# Patient Record
Sex: FEMALE | Race: BLACK OR AFRICAN AMERICAN | Employment: FULL TIME | URBAN - METROPOLITAN AREA
[De-identification: names, ages, dates, MRNs, and addresses within clinical notes are randomized per-mention and may not be internally consistent; named-entity substitution may affect disease eponyms.]

---

## 2017-12-19 ENCOUNTER — OFFICE VISIT (OUTPATIENT)
Dept: PRIMARY CARE CLINIC | Age: 47
End: 2017-12-19

## 2017-12-19 DIAGNOSIS — J02.9 SORE THROAT: Primary | ICD-10-CM

## 2017-12-19 DIAGNOSIS — H65.93 BILATERAL NON-SUPPURATIVE OTITIS MEDIA: Primary | ICD-10-CM

## 2017-12-19 LAB
S PYO AG THROAT QL: NEGATIVE
VALID INTERNAL CONTROL?: YES

## 2017-12-19 RX ORDER — AMOXICILLIN AND CLAVULANATE POTASSIUM 875; 125 MG/1; MG/1
1 TABLET, FILM COATED ORAL EVERY 12 HOURS
Qty: 20 TAB | Refills: 0 | Status: SHIPPED | OUTPATIENT
Start: 2017-12-19 | End: 2017-12-29

## 2017-12-19 RX ORDER — ALBUTEROL SULFATE 90 UG/1
AEROSOL, METERED RESPIRATORY (INHALATION)
COMMUNITY
Start: 2017-12-04 | End: 2022-10-10 | Stop reason: ALTCHOICE

## 2017-12-19 RX ORDER — DICLOFENAC SODIUM 10 MG/G
GEL TOPICAL
Refills: 0 | COMMUNITY
Start: 2017-11-14 | End: 2022-06-17

## 2017-12-19 NOTE — PROGRESS NOTES
Chief Complaint   Patient presents with    Sore Throat   c/o sore throat and productive cough with green mucus x 3 days, she denies any nausea,vomiting or fever. She denies taking anything for discomfort. Pt does confirm she has received flu shot. This note will not be viewable in [address]

## 2017-12-19 NOTE — PATIENT INSTRUCTIONS
Ear Infection (Otitis Media): Care Instructions  Your Care Instructions    An ear infection may start with a cold and affect the middle ear (otitis media). It can hurt a lot. Most ear infections clear up on their own in a couple of days. Most often you will not need antibiotics. This is because many ear infections are caused by a virus. Antibiotics don't work against a virus. Regular doses of pain medicines are the best way to reduce your fever and help you feel better. Follow-up care is a key part of your treatment and safety. Be sure to make and go to all appointments, and call your doctor if you are having problems. It's also a good idea to know your test results and keep a list of the medicines you take. How can you care for yourself at home? · Take pain medicines exactly as directed. ¨ If the doctor gave you a prescription medicine for pain, take it as prescribed. ¨ If you are not taking a prescription pain medicine, take an over-the-counter medicine, such as acetaminophen (Tylenol), ibuprofen (Advil, Motrin), or naproxen (Aleve). Read and follow all instructions on the label. ¨ Do not take two or more pain medicines at the same time unless the doctor told you to. Many pain medicines have acetaminophen, which is Tylenol. Too much acetaminophen (Tylenol) can be harmful. · Plan to take a full dose of pain reliever before bedtime. Getting enough sleep will help you get better. · Try a warm, moist washcloth on the ear. It may help relieve pain. · If your doctor prescribed antibiotics, take them as directed. Do not stop taking them just because you feel better. You need to take the full course of antibiotics. When should you call for help? Call your doctor now or seek immediate medical care if:  ? · You have new or increasing ear pain. ? · You have new or increasing pus or blood draining from your ear. ? · You have a fever with a stiff neck or a severe headache. ? Watch closely for changes in your health, and be sure to contact your doctor if:  ? · You have new or worse symptoms. ? · You are not getting better after taking an antibiotic for 2 days. Where can you learn more? Go to http://rahul-rima.info/. Enter X210 in the search box to learn more about \"Ear Infection (Otitis Media): Care Instructions. \"  Current as of: May 12, 2017  Content Version: 11.4  © 3898-3485 Healthwise, [employer]. Care instructions adapted under license by [employer] (which disclaims liability or warranty for this information). If you have questions about a medical condition or this instruction, always ask your healthcare professional. [place] [address] any warranty or liability for your use of this information.

## 2017-12-19 NOTE — PROGRESS NOTES
Subjective:      Andre Alejo is a 52 y.o. female who presents for possible ear infection. Symptoms include bilateral ear pain, congestion, sore throat and fever. Onset of symptoms was 2 days ago, gradually worsening since that time. Associated symptoms include congestion, which have been present for 3 days . She is drinking plenty of fluids. Problem List:     Patient Active Problem List    Diagnosis Date Noted    HTN (hypertension), benign 12/05/2015     Medical History:     Past Medical History:   Diagnosis Date    Hypertension      Allergies:   No Known Allergies   Medications:     Current Outpatient Prescriptions   Medication Sig    amoxicillin-clavulanate (AUGMENTIN) 875-125 mg per tablet Take 1 Tab by mouth every twelve (12) hours for 10 days.  amLODIPine (NORVASC) 5 mg tablet     lansoprazole (PREVACID) 30 mg capsule     JUNEL FE 1.5/30, 28, 1.5 mg-30 mcg (21)/75 mg (7) tablet     montelukast (SINGULAIR) 10 mg tablet Take 10 mg by mouth daily.  diclofenac (VOLTAREN) 1 % gel WYATT 2 GRAMS EXT AA QID    PROAIR HFA 90 mcg/actuation inhaler      No current facility-administered medications for this visit. Surgical History:     Past Surgical History:   Procedure Laterality Date    HX HEENT      lasix     Social History:     Social History     Social History    Marital status:      Spouse name: N/A    Number of children: N/A    Years of education: N/A     Social History Main Topics    Smoking status: Never Smoker    Smokeless tobacco: Never Used    Alcohol use No    Drug use: No    Sexual activity: Yes     Partners: Male     Birth control/ protection: Pill      Comment: Imothy     Other Topics Concern    None     Social History Narrative         Objective:     ROS:   Feeling well. No dyspnea or chest pain on exertion. No abdominal pain, change in bowel habits, black or bloody stools. No urinary tract symptoms.  GYN ROS: normal menses, no abnormal bleeding, pelvic pain or discharge, no breast pain or new or enlarging lumps on self exam. No neurological complaints. OBJECTIVE:   The patient appears well, alert, oriented x 3, in no distress. There were no vitals taken for this visit. HEENT:ENT bilat TM's red, bulging. Neck supple. No adenopathy or thyromegaly. LAWANDA. Chest: Lungs are clear, good air entry, no wheezes, rhonchi or rales. Cardiovascular: S1 and S2 normal, no murmurs, regular rate and rhythm. Abdomen: soft without tenderness, guarding, mass or organomegaly. Extremities: show no edema, normal peripheral pulses. Neurological: is normal, no focal findings. Assessment/Plan:       ICD-10-CM ICD-9-CM    1. Sore throat J02.9 462 AMB POC RAPID STREP A   Augmentin 875 mg BID x 10 days sent in to her pharmacy, instructed her on OTC medications to assist with symptoms. Blood pressure elevated today due to illness. Se will recheck it over the next few days and if still elevated, she will follow-up with PCP.

## 2018-01-24 ENCOUNTER — OFFICE VISIT (OUTPATIENT)
Dept: PRIMARY CARE CLINIC | Age: 48
End: 2018-01-24

## 2018-01-24 VITALS
HEART RATE: 79 BPM | DIASTOLIC BLOOD PRESSURE: 96 MMHG | RESPIRATION RATE: 17 BRPM | WEIGHT: 178 LBS | SYSTOLIC BLOOD PRESSURE: 139 MMHG | BODY MASS INDEX: 29.66 KG/M2 | OXYGEN SATURATION: 98 % | TEMPERATURE: 98.3 F | HEIGHT: 65 IN

## 2018-01-24 DIAGNOSIS — H65.91 RIGHT NON-SUPPURATIVE OTITIS MEDIA: Primary | ICD-10-CM

## 2018-01-24 DIAGNOSIS — H65.91 RIGHT NON-SUPPURATIVE OTITIS MEDIA: ICD-10-CM

## 2018-01-24 DIAGNOSIS — J06.9 VIRAL URI: ICD-10-CM

## 2018-01-24 DIAGNOSIS — J06.9 VIRAL URI: Primary | ICD-10-CM

## 2018-01-24 RX ORDER — CEFDINIR 300 MG/1
300 CAPSULE ORAL 2 TIMES DAILY
Qty: 14 CAP | Refills: 0 | Status: SHIPPED | OUTPATIENT
Start: 2018-01-28 | End: 2018-02-04

## 2018-01-24 NOTE — PROGRESS NOTES
Chief Complaint   Patient presents with    Ear Pain   c/o bilateral ear pain and pressure x 2 days, pt states she took otc allergy pill to help with discomfort.

## 2018-01-24 NOTE — PROGRESS NOTES
Subjective:      Kristin Timmons is a 52 y.o. female who presents for bilateral ear pain and coughing. Ear pain started 2 days ago worse today. Cough & congestions also started 2 days ago. Cough is occasionally productive clear and yellow sputum. Nasal congestion but no sore throat. She denies fever or chills. She has received flu vaccine. Daughter with viral illness last week. She has tried Tylenol today. Review of Systems   Constitutional: Negative for chills and fever. HENT: Positive for congestion and ear pain. Negative for sinus pain and sore throat. Eyes: Negative for pain and redness. Respiratory: Positive for cough. Negative for stridor. Cardiovascular: Negative for chest pain and palpitations. Gastrointestinal: Negative for abdominal pain, diarrhea and nausea. Genitourinary: Negative for dysuria, frequency and urgency. Musculoskeletal: Negative for back pain, falls and neck pain. Skin: Negative for rash. Neurological: Negative for tingling, sensory change, focal weakness, weakness and headaches. Past Medical History:   Diagnosis Date    Hypertension      Current Outpatient Prescriptions   Medication Sig Dispense Refill    [START ON 1/28/2018] cefdinir (OMNICEF) 300 mg capsule Take 1 Cap by mouth two (2) times a day for 7 days. 14 Cap 0    diclofenac (VOLTAREN) 1 % gel WYATT 2 GRAMS EXT AA QID  0    PROAIR HFA 90 mcg/actuation inhaler       amLODIPine (NORVASC) 5 mg tablet   11    lansoprazole (PREVACID) 30 mg capsule   6    JUNEL FE 1.5/30, 28, 1.5 mg-30 mcg (21)/75 mg (7) tablet   13    montelukast (SINGULAIR) 10 mg tablet Take 10 mg by mouth daily. No Known Allergies      Objective:     Visit Vitals    BP (!) 139/96 (BP 1 Location: Left arm, BP Patient Position: Sitting)    Pulse 79    Temp 98.3 °F (36.8 °C) (Oral)    Resp 17    Ht 5' 4.5\" (1.638 m)    Wt 178 lb (80.7 kg)    SpO2 98%    BMI 30.08 kg/m2     GEN: No apparent distress.  Alert and oriented and responds to all questions appropriately. EYES:  Conjunctiva clear; pupils round and reactive to light; extraocular movements are intact. EAR: External ears are normal.  Left tympanic membrane is clear and without effusion. Right tympanic membrane is mildly erythematous with mild clear effusion but no bulging. NOSE: Turbinates are edematous, clear drainage  OROPHYARYNX: No oral lesions or exudates. NECK:  Supple; no masses; thyroid normal           LUNGS: Respirations unlabored; clear to auscultation bilaterally  CARDIOVASCULAR: Regular, rate, and rhythm without murmurs, gallops or rubs   EXT: Well perfused. No edema. Moving all extremities equally. SKIN: No obvious rashes. Assessment:       ICD-10-CM ICD-9-CM    1. Viral URI J06.9 465.9     B97.89     2. Right non-suppurative otitis media H65.91 381. 4      Right ear pain with mild clear effusion and erythema possibly related to viral URI. Treated for bilateral AOM a month ago. Advised to try nasal decongestant such as afrin x2-3 days, anti-histamine, saline rinses, if pain worsening or not improving in 3-4 days or if febrile, may start abx. Return to clinic if symptoms not improving in 2-3 days. If symptoms worsen, then return to clinic immediately or go to the emergency room. This note will not be viewable in [address]

## 2018-09-19 ENCOUNTER — HOSPITAL ENCOUNTER (EMERGENCY)
Age: 48
Discharge: LEFT BEFORE DISCHARGE | End: 2018-09-20
Attending: EMERGENCY MEDICINE
Payer: COMMERCIAL

## 2018-09-19 VITALS
HEART RATE: 105 BPM | DIASTOLIC BLOOD PRESSURE: 111 MMHG | HEIGHT: 64 IN | WEIGHT: 171.96 LBS | BODY MASS INDEX: 29.36 KG/M2 | OXYGEN SATURATION: 100 % | SYSTOLIC BLOOD PRESSURE: 201 MMHG | RESPIRATION RATE: 17 BRPM

## 2018-09-19 PROCEDURE — 75810000275 HC EMERGENCY DEPT VISIT NO LEVEL OF CARE

## 2018-09-19 PROCEDURE — 93005 ELECTROCARDIOGRAM TRACING: CPT

## 2018-09-20 LAB
ATRIAL RATE: 98 BPM
CALCULATED P AXIS, ECG09: 65 DEGREES
CALCULATED R AXIS, ECG10: 21 DEGREES
CALCULATED T AXIS, ECG11: 13 DEGREES
DIAGNOSIS, 93000: NORMAL
P-R INTERVAL, ECG05: 166 MS
Q-T INTERVAL, ECG07: 352 MS
QRS DURATION, ECG06: 88 MS
QTC CALCULATION (BEZET), ECG08: 449 MS
VENTRICULAR RATE, ECG03: 98 BPM

## 2019-02-08 ENCOUNTER — OFFICE VISIT (OUTPATIENT)
Dept: PRIMARY CARE CLINIC | Age: 49
End: 2019-02-08

## 2019-02-08 VITALS
HEART RATE: 93 BPM | TEMPERATURE: 98.3 F | BODY MASS INDEX: 29.6 KG/M2 | DIASTOLIC BLOOD PRESSURE: 98 MMHG | WEIGHT: 173.4 LBS | SYSTOLIC BLOOD PRESSURE: 145 MMHG | HEIGHT: 64 IN | RESPIRATION RATE: 16 BRPM

## 2019-02-08 DIAGNOSIS — J01.00 ACUTE MAXILLARY SINUSITIS, RECURRENCE NOT SPECIFIED: Primary | ICD-10-CM

## 2019-02-08 DIAGNOSIS — H66.92 LEFT ACUTE OTITIS MEDIA: ICD-10-CM

## 2019-02-08 DIAGNOSIS — I10 ESSENTIAL HYPERTENSION: ICD-10-CM

## 2019-02-08 RX ORDER — AMOXICILLIN AND CLAVULANATE POTASSIUM 875; 125 MG/1; MG/1
1 TABLET, FILM COATED ORAL EVERY 12 HOURS
Qty: 14 TAB | Refills: 0 | Status: SHIPPED | OUTPATIENT
Start: 2019-02-08 | End: 2019-02-15

## 2019-02-08 RX ORDER — FLUTICASONE PROPIONATE 50 MCG
2 SPRAY, SUSPENSION (ML) NASAL DAILY
Qty: 1 BOTTLE | Refills: 0 | Status: SHIPPED | OUTPATIENT
Start: 2019-02-08 | End: 2019-02-08 | Stop reason: SDUPTHER

## 2019-02-08 RX ORDER — FLUTICASONE PROPIONATE 50 MCG
2 SPRAY, SUSPENSION (ML) NASAL DAILY
Qty: 1 BOTTLE | Refills: 0 | Status: SHIPPED | OUTPATIENT
Start: 2019-02-08

## 2019-02-08 RX ORDER — AMOXICILLIN AND CLAVULANATE POTASSIUM 875; 125 MG/1; MG/1
1 TABLET, FILM COATED ORAL EVERY 12 HOURS
Qty: 14 TAB | Refills: 0 | Status: SHIPPED | OUTPATIENT
Start: 2019-02-08 | End: 2019-02-08 | Stop reason: SDUPTHER

## 2019-02-08 NOTE — PATIENT INSTRUCTIONS
Sinusitis: Care Instructions Your Care Instructions Sinusitis is an infection of the lining of the sinus cavities in your head. Sinusitis often follows a cold. It causes pain and pressure in your head and face. In most cases, sinusitis gets better on its own in 1 to 2 weeks. But some mild symptoms may last for several weeks. Sometimes antibiotics are needed. Follow-up care is a key part of your treatment and safety. Be sure to make and go to all appointments, and call your doctor if you are having problems. It's also a good idea to know your test results and keep a list of the medicines you take. How can you care for yourself at home? · Take an over-the-counter pain medicine, such as acetaminophen (Tylenol), ibuprofen (Advil, Motrin), or naproxen (Aleve). Read and follow all instructions on the label. · If the doctor prescribed antibiotics, take them as directed. Do not stop taking them just because you feel better. You need to take the full course of antibiotics. · Be careful when taking over-the-counter cold or flu medicines and Tylenol at the same time. Many of these medicines have acetaminophen, which is Tylenol. Read the labels to make sure that you are not taking more than the recommended dose. Too much acetaminophen (Tylenol) can be harmful. · Breathe warm, moist air from a steamy shower, a hot bath, or a sink filled with hot water. Avoid cold, dry air. Using a humidifier in your home may help. Follow the directions for cleaning the machine. · Use saline (saltwater) nasal washes to help keep your nasal passages open and wash out mucus and bacteria. You can buy saline nose drops at a grocery store or drugstore. Or you can make your own at home by adding 1 teaspoon of salt and 1 teaspoon of baking soda to 2 cups of distilled water. If you make your own, fill a bulb syringe with the solution, insert the tip into your nostril, and squeeze gently. Darian Clonts your nose. · Put a hot, wet towel or a warm gel pack on your face 3 or 4 times a day for 5 to 10 minutes each time. · Try a decongestant nasal spray like oxymetazoline (Afrin). Do not use it for more than 3 days in a row. Using it for more than 3 days can make your congestion worse. When should you call for help? Call your doctor now or seek immediate medical care if: 
  · You have new or worse swelling or redness in your face or around your eyes.  
  · You have a new or higher fever.  
 Watch closely for changes in your health, and be sure to contact your doctor if: 
  · You have new or worse facial pain.  
  · The mucus from your nose becomes thicker (like pus) or has new blood in it.  
  · You are not getting better as expected. Where can you learn more? Go to http://rahul-rima.info/. Enter V380 in the search box to learn more about \"Sinusitis: Care Instructions. \" Current as of: March 27, 2018 Content Version: 11.9 © 4814-2784 [employer]. Care instructions adapted under license by [employer] (which disclaims liability or warranty for this information). If you have questions about a medical condition or this instruction, always ask your healthcare professional. [address] any warranty or liability for your use of this information.

## 2019-02-08 NOTE — PROGRESS NOTES
Went to [facility] on Sunday- dx'd with viral URI. Called on Wed- no better. [facility] called in [address] Now her LEFT ear hurts, L sided H/A, cough and worried about hernia left of umbilicus.

## 2019-02-08 NOTE — LETTER
NOTIFICATION RETURN TO WORK / SCHOOL 
 
2/8/2019 2:18 PM 
 
Ms. Carmela Burgos [place] [address] To Whom It May Concern: 
 
Carmela Burgos is currently under the care of [address] [place] [address]. She will return to work/school on 2/11/2019. If there are questions or concerns please have the patient contact our office. Sincerely, Rodger Saleh NP

## 2019-02-11 NOTE — PROGRESS NOTES
Subjective:  
Yaakov Galeazzi is a 50 y.o. female who complains of congestion, sore throat, post nasal drip, dry cough, headache, bilateral sinus pain and left ear pain for 5 days, gradually worsening since that time. Symptoms started 5 days ago. Seen at [facility] urgent care and diagnosed with viral URI. No improvement after few days. Called back and Karie Gottlieb was called in. Taking zpack < 48 hrs without significant improvement. Head and nasal congestion persist.  More recently developed left ear pain. Now abdominal soreness from coughing. She denies a history of shortness of breath and wheezing. Patient does not smoke cigarettes. Relevant PMH:  
Past Medical History:  
Diagnosis Date  Hypertension Past Surgical History:  
Procedure Laterality Date  HX HEENT    
 lasix Allergies Allergen Reactions  Compazine [Prochlorperazine] Anaphylaxis  Sulfa (Sulfonamide Antibiotics) Anaphylaxis Review of Systems Pertinent items are noted in HPI. Objective:  
 
Visit Vitals BP (!) 145/98 (BP 1 Location: Right arm, BP Patient Position: Sitting) Pulse 93 Temp 98.3 °F (36.8 °C) (Oral) Resp 16 Ht 5' 4\" (1.626 m) Wt 173 lb 6.4 oz (78.7 kg) BMI 29.76 kg/m² General:  alert, cooperative, no distress Eyes: negative Ears: abnormal TM AS - erythematous, dull, bulging Sinuses: tenderness over bilateral maxillary and frontal  
Mouth:  Lips, mucosa, and tongue normal. Teeth and gums normal and normal findings: oropharynx pink & moist without lesions or evidence of thrush Neck: supple, symmetrical, trachea midline and no adenopathy. Heart: S1 and S2 normal, no murmurs noted. Lungs: clear to auscultation bilaterally Abdomen: soft, non-tender. Bowel sounds normal. No masses,  no organomegaly. No hernia is appreciated. Assessment/Plan: ICD-10-CM ICD-9-CM 1. Acute maxillary sinusitis, recurrence not specified J01.00 461.0 2. Left acute otitis media H66.92 382.9 3. Essential hypertension I10 401.9   
-BP noted. Pt reports compliance with antihypertensive meds. Acutely ill today. Continue to monitor prn. Orders Placed This Encounter  DISCONTD: amoxicillin-clavulanate (AUGMENTIN) 875-125 mg per tablet  DISCONTD: fluticasone (FLONASE) 50 mcg/actuation nasal spray  
 amoxicillin-clavulanate (AUGMENTIN) 875-125 mg per tablet  fluticasone (FLONASE) 50 mcg/actuation nasal spray Stop Z-pack and start Augmentin. Discussed the dx and tx of sinusitis. Suggested symptomatic OTC remedies. Nasal saline sprays for congestion. Antibiotics per orders. RTC prn. Kelli Pantoja NP This note will not be viewable in [address]

## 2019-11-02 ENCOUNTER — OFFICE VISIT (OUTPATIENT)
Dept: PRIMARY CARE CLINIC | Age: 49
End: 2019-11-02

## 2019-11-02 VITALS
OXYGEN SATURATION: 96 % | TEMPERATURE: 98 F | HEART RATE: 73 BPM | DIASTOLIC BLOOD PRESSURE: 85 MMHG | RESPIRATION RATE: 15 BRPM | HEIGHT: 64 IN | BODY MASS INDEX: 30.19 KG/M2 | WEIGHT: 176.8 LBS | SYSTOLIC BLOOD PRESSURE: 127 MMHG

## 2019-11-02 DIAGNOSIS — R05.9 COUGH: ICD-10-CM

## 2019-11-02 DIAGNOSIS — J30.9 ALLERGIC RHINITIS, UNSPECIFIED SEASONALITY, UNSPECIFIED TRIGGER: ICD-10-CM

## 2019-11-02 DIAGNOSIS — H10.33 ACUTE CONJUNCTIVITIS OF BOTH EYES, UNSPECIFIED ACUTE CONJUNCTIVITIS TYPE: Primary | ICD-10-CM

## 2019-11-02 DIAGNOSIS — R09.81 HEAD CONGESTION: ICD-10-CM

## 2019-11-02 DIAGNOSIS — J06.9 VIRAL URI: ICD-10-CM

## 2019-11-02 RX ORDER — POTASSIUM CHLORIDE 750 MG/1
TABLET, EXTENDED RELEASE ORAL
COMMUNITY
End: 2022-10-10

## 2019-11-02 RX ORDER — NEOMYCIN SULFATE, POLYMYXIN B SULFATE AND DEXAMETHASONE 3.5; 10000; 1 MG/ML; [USP'U]/ML; MG/ML
1 SUSPENSION/ DROPS OPHTHALMIC 2 TIMES DAILY
Qty: 1 BOTTLE | Refills: 0 | Status: SHIPPED | OUTPATIENT
Start: 2019-11-02 | End: 2022-10-10

## 2019-11-02 RX ORDER — CHLORTHALIDONE 25 MG/1
25 TABLET ORAL
Refills: 0 | COMMUNITY
Start: 2019-09-30 | End: 2022-10-10

## 2019-11-02 RX ORDER — ACETAMINOPHEN AND CODEINE PHOSPHATE 120; 12 MG/5ML; MG/5ML
SOLUTION ORAL
COMMUNITY
End: 2022-10-10

## 2019-11-02 RX ORDER — BENZONATATE 200 MG/1
200 CAPSULE ORAL
Qty: 21 CAP | Refills: 0 | Status: SHIPPED | OUTPATIENT
Start: 2019-11-02 | End: 2019-11-09

## 2019-11-02 RX ORDER — LEVOCETIRIZINE DIHYDROCHLORIDE 5 MG/1
5 TABLET, FILM COATED ORAL DAILY
Qty: 30 TAB | Refills: 0 | Status: SHIPPED | OUTPATIENT
Start: 2019-11-02 | End: 2019-12-02

## 2019-11-02 NOTE — PATIENT INSTRUCTIONS
Pinkeye: Care Instructions  Your Care Instructions    Pinkeye is redness and swelling of the eye surface and the conjunctiva (the lining of the eyelid and the covering of the white part of the eye). Pinkeye is also called conjunctivitis. Pinkeye is often caused by infection with bacteria or a virus. Dry air, allergies, smoke, and chemicals are other common causes. Pinkeye often clears on its own in 7 to 10 days. Antibiotics only help if the pinkeye is caused by bacteria. Pinkeye caused by infection spreads easily. If an allergy or chemical is causing pinkeye, it will not go away unless you can avoid whatever is causing it. Follow-up care is a key part of your treatment and safety. Be sure to make and go to all appointments, and call your doctor if you are having problems. It's also a good idea to know your test results and keep a list of the medicines you take. How can you care for yourself at home? · Wash your hands often. Always wash them before and after you treat pinkeye or touch your eyes or face. · Use moist cotton or a clean, wet cloth to remove crust. Wipe from the inside corner of the eye to the outside. Use a clean part of the cloth for each wipe. · Put cold or warm wet cloths on your eye a few times a day if the eye hurts. · Do not wear contact lenses or eye makeup until the pinkeye is gone. Throw away any eye makeup you were using when you got pinkeye. Clean your contacts and storage case. If you wear disposable contacts, use a new pair when your eye has cleared and it is safe to wear contacts again. · If the doctor gave you antibiotic ointment or eyedrops, use them as directed. Use the medicine for as long as instructed, even if your eye starts looking better soon. Keep the bottle tip clean, and do not let it touch the eye area. · To put in eyedrops or ointment:  ? Tilt your head back, and pull your lower eyelid down with one finger. ?  Drop or squirt the medicine inside the lower lid.  ? Close your eye for 30 to 60 seconds to let the drops or ointment move around. ? Do not touch the ointment or dropper tip to your eyelashes or any other surface. · Do not share towels, pillows, or washcloths while you have pinkeye. When should you call for help? Call your doctor now or seek immediate medical care if:    · You have pain in your eye, not just irritation on the surface.     · You have a change in vision or loss of vision.     · You have an increase in discharge from the eye.     · Your eye has not started to improve or begins to get worse within 48 hours after you start using antibiotics.     · Pinkeye lasts longer than 7 days.    Watch closely for changes in your health, and be sure to contact your doctor if you have any problems. Where can you learn more? Go to http://rahul-rima.info/. Enter Y392 in the search box to learn more about \"Pinkeye: Care Instructions. \"  Current as of: June 26, 2019  Content Version: 12.2  © 5669-9103 Healthwise[employer]. Care instructions adapted under license by [employer] (which disclaims liability or warranty for this information). If you have questions about a medical condition or this instruction, always ask your healthcare professional. [address] any warranty or liability for your use of this information.

## 2019-11-02 NOTE — PROGRESS NOTES
Em Verma is a 52 y.o. female    Room:6    Chief Complaint   Patient presents with    Cold Symptoms     Pt States \" having issue right eye kind of runny and crusty, light irritates it, cough some phlegm comes up and sometimes it doesnt X2 weeks, has not taking medication for symptoms\". Visit Vitals  /85 (BP 1 Location: Left arm, BP Patient Position: Sitting)   Pulse 73   Temp 98 °F (36.7 °C) (Oral)   Resp 15   Ht 5' 4\" (1.626 m)   Wt 176 lb 12.8 oz (80.2 kg)   SpO2 96%   BMI 30.35 kg/m²       Pain Scale: 0 - No pain/10    1. Have you been to the ER, urgent care clinic since your last visit? Hospitalized since your last visit? No    2. Have you seen or consulted any other health care providers outside of the [address] [place] [address] since your last visit? Include any pap smears or colon screening.  No

## 2019-11-05 NOTE — PROGRESS NOTES
Subjective:   Cesar Manzano is a 52 y.o. female who presents for evaluation of redness. She has noticed the above symptoms in the bilateral eye for 2 days. Onset was acute. Symptoms have included discharge, itching. Patient denies blurred vision, visual field deficit, photophobia. There is a history of allergies    Review of Systems  negative, wears glasses or bifocals, wears contact lenses, blurry vision, eye pain, seeing double, spots before eyes, blind areas    Objective:     Visit Vitals  /85 (BP 1 Location: Left arm, BP Patient Position: Sitting)   Pulse 73   Temp 98 °F (36.7 °C) (Oral)   Resp 15   Ht 5' 4\" (1.626 m)   Wt 176 lb 12.8 oz (80.2 kg)   LMP  (LMP Unknown)   SpO2 96%   BMI 30.35 kg/m²                 General: alert, cooperative, no distress, appears stated age   Eyes:  positive findings: sclera erythematous   Vision: See above, Not performed   Fluorescein:  no uptake seen     Assessment/Plan:     acute conjunctivitis    1. Discussed the diagnosis and proper care of conjunctivitis. Stressed household hygiene. 2. Patient instructions: contagiousness precautions  3. Risks and side effects of medications was discussed. 4. Pt advised to read written information provided by the pharmacist: yes  5. Followup as needed. ICD-10-CM ICD-9-CM    1. Acute conjunctivitis of both eyes, unspecified acute conjunctivitis type H10.33 372.00 neomycin-polymyxin-dexamethasone (MAXITROL) ophthalmic suspension   2. Viral URI J06.9 465.9 levocetirizine (XYZAL) 5 mg tablet   3. Cough R05 786.2 benzonatate (TESSALON) 200 mg capsule   4. Head congestion R09.81 478.19 levocetirizine (XYZAL) 5 mg tablet     reviewed medications and side effects in detail.

## 2019-12-26 ENCOUNTER — OFFICE VISIT (OUTPATIENT)
Dept: PRIMARY CARE CLINIC | Age: 49
End: 2019-12-26

## 2019-12-26 VITALS
OXYGEN SATURATION: 94 % | WEIGHT: 177.8 LBS | HEIGHT: 64 IN | TEMPERATURE: 98.1 F | SYSTOLIC BLOOD PRESSURE: 120 MMHG | HEART RATE: 82 BPM | BODY MASS INDEX: 30.35 KG/M2 | RESPIRATION RATE: 16 BRPM | DIASTOLIC BLOOD PRESSURE: 80 MMHG

## 2019-12-26 DIAGNOSIS — M54.9 BACK PAIN, UNSPECIFIED BACK LOCATION, UNSPECIFIED BACK PAIN LATERALITY, UNSPECIFIED CHRONICITY: Primary | ICD-10-CM

## 2019-12-26 LAB
BILIRUB UR QL STRIP: NEGATIVE
GLUCOSE UR-MCNC: NEGATIVE MG/DL
KETONES P FAST UR STRIP-MCNC: NEGATIVE MG/DL
PH UR STRIP: 7.5 [PH] (ref 4.6–8)
PROT UR QL STRIP: NEGATIVE
SP GR UR STRIP: 1.01 (ref 1–1.03)
UA UROBILINOGEN AMB POC: NORMAL (ref 0.2–1)
URINALYSIS CLARITY POC: CLEAR
URINALYSIS COLOR POC: YELLOW
URINE BLOOD POC: NEGATIVE
URINE LEUKOCYTES POC: NEGATIVE
URINE NITRITES POC: NEGATIVE

## 2019-12-26 RX ORDER — DICLOFENAC SODIUM 75 MG/1
75 TABLET, DELAYED RELEASE ORAL 2 TIMES DAILY
Qty: 30 TAB | Refills: 0 | Status: SHIPPED | OUTPATIENT
Start: 2019-12-26 | End: 2022-06-17

## 2019-12-26 RX ORDER — HYDROCHLOROTHIAZIDE 25 MG/1
25 TABLET ORAL
COMMUNITY
End: 2022-10-10

## 2019-12-26 RX ORDER — TRIAMCINOLONE ACETONIDE 1 MG/G
CREAM TOPICAL
COMMUNITY
Start: 2019-11-10

## 2019-12-26 RX ORDER — LANSOPRAZOLE 30 MG/1
CAPSULE, DELAYED RELEASE ORAL
COMMUNITY
Start: 2012-08-16 | End: 2019-12-26 | Stop reason: SDUPTHER

## 2019-12-26 RX ORDER — BUTALBITAL, ACETAMINOPHEN AND CAFFEINE 50; 325; 40 MG/1; MG/1; MG/1
1 TABLET ORAL
COMMUNITY
End: 2022-10-10

## 2019-12-26 RX ORDER — NORETHINDRONE ACETATE AND ETHINYL ESTRADIOL 1MG-20(21)
KIT ORAL
COMMUNITY
Start: 2012-10-18 | End: 2019-12-26 | Stop reason: SDUPTHER

## 2019-12-26 RX ORDER — VALSARTAN AND HYDROCHLOROTHIAZIDE 160; 12.5 MG/1; MG/1
TABLET, FILM COATED ORAL
COMMUNITY
Start: 2019-11-13

## 2019-12-26 RX ORDER — MONTELUKAST SODIUM 10 MG/1
TABLET ORAL
COMMUNITY
Start: 2016-12-20 | End: 2019-12-26 | Stop reason: SDUPTHER

## 2019-12-26 RX ORDER — NORETHINDRONE ACETATE AND ETHINYL ESTRADIOL .03; 1.5 MG/1; MG/1
1 TABLET ORAL
COMMUNITY
End: 2022-10-10

## 2019-12-26 RX ORDER — AMLODIPINE BESYLATE 5 MG/1
TABLET ORAL
COMMUNITY
End: 2019-12-26 | Stop reason: SDUPTHER

## 2019-12-26 RX ORDER — MINERAL OIL
180 ENEMA (ML) RECTAL
COMMUNITY
End: 2022-10-10

## 2019-12-26 NOTE — PROGRESS NOTES
Heidi Parra is a 52 y.o. female    Room:4    Chief Complaint   Patient presents with    Back Pain     Pt c/o low back pain, Pt States \" last couple of days low back pain and just a little cough, has taken a tessalon chevy\". Visit Vitals  /80 (BP 1 Location: Left arm, BP Patient Position: Sitting)   Pulse 82   Temp 98.1 °F (36.7 °C) (Oral)   Resp 16   Ht 5' 4\" (1.626 m)   Wt 177 lb 12.8 oz (80.6 kg)   SpO2 94%   BMI 30.52 kg/m²       Pain Scale: 6/10    1. Have you been to the ER, urgent care clinic since your last visit? Hospitalized since your last visit? No    2. Have you seen or consulted any other health care providers outside of the [address] [place] [address] since your last visit? Include any pap smears or colon screening.  No

## 2019-12-26 NOTE — PATIENT INSTRUCTIONS
Learning About How to Have a Healthy Back  What causes back pain? Back pain is often caused by overuse, strain, or injury. For example, people often hurt their backs playing sports or working in the yard, being jolted in a car accident, or lifting something too heavy. Aging plays a part too. Your bones and muscles tend to lose strength as you age, which makes injury more likely. The spongy discs between the bones of the spine (vertebrae) may suffer from wear and tear and no longer provide enough cushion between the bones. A disc that bulges or breaks open (herniated disc) can press on nerves, causing back pain. In some people, back pain is the result of arthritis, broken vertebrae caused by bone loss (osteoporosis), illness, or a spine problem. Although most people have back pain at one time or another, there are steps you can take to make it less likely. How can you have a healthy back? Reduce stress on your back through good posture  Slumping or slouching alone may not cause low back pain. But after the back has been strained or injured, bad posture can make pain worse. · Sleep in a position that maintains your back's normal curves and on a mattress that feels comfortable. Sleep on your side with a pillow between your knees, or sleep on your back with a pillow under your knees. These positions can reduce strain on your back. · Stand and sit up straight. \"Good posture\" generally means your ears, shoulders, and hips are in a straight line. · If you must stand for a long time, put one foot on a stool, ledge, or box. Switch feet every now and then. · Sit in a chair that is low enough to let you place both feet flat on the floor with both knees nearly level with your hips. If your chair or desk is too high, use a footrest to raise your knees. Place a small pillow, a rolled-up towel, or a lumbar roll in the curve of your back if you need extra support.   · Try a kneeling chair, which helps tilt your hips forward. This takes pressure off your lower back. · Try sitting on an exercise ball. It can rock from side to side, which helps keep your back loose. · When driving, keep your knees nearly level with your hips. Sit straight, and drive with both hands on the steering wheel. Your arms should be in a slightly bent position. Reduce stress on your back through careful lifting  · Squat down, bending at the hips and knees only. If you need to, put one knee to the floor and extend your other knee in front of you, bent at a right angle (half kneeling). · Press your chest straight forward. This helps keep your upper back straight while keeping a slight arch in your low back. · Hold the load as close to your body as possible, at the level of your belly button (navel). · Use your feet to change direction, taking small steps. · Lead with your hips as you change direction. Keep your shoulders in line with your hips as you move. · Set down your load carefully, squatting with your knees and hips only. Exercise and stretch your back  · Do some exercise on most days of the week, if your doctor says it is okay. You can walk, run, swim, or cycle. · Stretch your back muscles. Here are a few exercises to try:  ? Lie on your back, and gently pull one bent knee to your chest. Put that foot back on the floor, and then pull the other knee to your chest.  ? Do pelvic tilts. Lie on your back with your knees bent. Tighten your stomach muscles. Pull your belly button (navel) in and up toward your ribs. You should feel like your back is pressing to the floor and your hips and pelvis are slightly lifting off the floor. Hold for 6 seconds while breathing smoothly. ? Sit with your back flat against a wall. · Keep your core muscles strong. The muscles of your back, belly (abdomen), and buttocks support your spine. ? Pull in your belly and imagine pulling your navel toward your spine. Hold this for 6 seconds, then relax.  Remember to keep breathing normally as you tense your muscles. ? Do curl-ups. Always do them with your knees bent. Keep your low back on the floor, and curl your shoulders toward your knees using a smooth, slow motion. Keep your arms folded across your chest. If this bothers your neck, try putting your hands behind your neck (not your head), with your elbows spread apart. ? Lie on your back with your knees bent and your feet flat on the floor. Tighten your belly muscles, and then push with your feet and raise your buttocks up a few inches. Hold this position 6 seconds as you continue to breathe normally, then lower yourself slowly to the floor. Repeat 8 to 12 times. ? If you like group exercise, try Pilates or yoga. These classes have poses that strengthen the core muscles. Lead a healthy lifestyle  · Stay at a healthy weight to avoid strain on your back. · Do not smoke. Smoking increases the risk of osteoporosis, which weakens the spine. If you need help quitting, talk to your doctor about stop-smoking programs and medicines. These can increase your chances of quitting for good. Where can you learn more? Go to http://[employer].info/. Enter L315 in the search box to learn more about \"Learning About How to Have a Healthy Back. \"  Current as of: June 26, 2019  Content Version: 12.2  © 6529-7332 [employer]. Care instructions adapted under license by [employer] (which disclaims liability or warranty for this information). If you have questions about a medical condition or this instruction, always ask your healthcare professional. [place] [address] any warranty or liability for your use of this information. Learning About How to Have a Healthy Back  What causes back pain? Back pain is often caused by overuse, strain, or injury.  For example, people often hurt their backs playing sports or working in the yard, being jolted in a car accident, or lifting something too heavy. Aging plays a part too. Your bones and muscles tend to lose strength as you age, which makes injury more likely. The spongy discs between the bones of the spine (vertebrae) may suffer from wear and tear and no longer provide enough cushion between the bones. A disc that bulges or breaks open (herniated disc) can press on nerves, causing back pain. In some people, back pain is the result of arthritis, broken vertebrae caused by bone loss (osteoporosis), illness, or a spine problem. Although most people have back pain at one time or another, there are steps you can take to make it less likely. How can you have a healthy back? Reduce stress on your back through good posture  Slumping or slouching alone may not cause low back pain. But after the back has been strained or injured, bad posture can make pain worse. · Sleep in a position that maintains your back's normal curves and on a mattress that feels comfortable. Sleep on your side with a pillow between your knees, or sleep on your back with a pillow under your knees. These positions can reduce strain on your back. · Stand and sit up straight. \"Good posture\" generally means your ears, shoulders, and hips are in a straight line. · If you must stand for a long time, put one foot on a stool, ledge, or box. Switch feet every now and then. · Sit in a chair that is low enough to let you place both feet flat on the floor with both knees nearly level with your hips. If your chair or desk is too high, use a footrest to raise your knees. Place a small pillow, a rolled-up towel, or a lumbar roll in the curve of your back if you need extra support. · Try a kneeling chair, which helps tilt your hips forward. This takes pressure off your lower back. · Try sitting on an exercise ball. It can rock from side to side, which helps keep your back loose. · When driving, keep your knees nearly level with your hips.  Sit straight, and drive with both hands on the steering wheel. Your arms should be in a slightly bent position. Reduce stress on your back through careful lifting  · Squat down, bending at the hips and knees only. If you need to, put one knee to the floor and extend your other knee in front of you, bent at a right angle (half kneeling). · Press your chest straight forward. This helps keep your upper back straight while keeping a slight arch in your low back. · Hold the load as close to your body as possible, at the level of your belly button (navel). · Use your feet to change direction, taking small steps. · Lead with your hips as you change direction. Keep your shoulders in line with your hips as you move. · Set down your load carefully, squatting with your knees and hips only. Exercise and stretch your back  · Do some exercise on most days of the week, if your doctor says it is okay. You can walk, run, swim, or cycle. · Stretch your back muscles. Here are a few exercises to try:  ? Lie on your back, and gently pull one bent knee to your chest. Put that foot back on the floor, and then pull the other knee to your chest.  ? Do pelvic tilts. Lie on your back with your knees bent. Tighten your stomach muscles. Pull your belly button (navel) in and up toward your ribs. You should feel like your back is pressing to the floor and your hips and pelvis are slightly lifting off the floor. Hold for 6 seconds while breathing smoothly. ? Sit with your back flat against a wall. · Keep your core muscles strong. The muscles of your back, belly (abdomen), and buttocks support your spine. ? Pull in your belly and imagine pulling your navel toward your spine. Hold this for 6 seconds, then relax. Remember to keep breathing normally as you tense your muscles. ? Do curl-ups. Always do them with your knees bent. Keep your low back on the floor, and curl your shoulders toward your knees using a smooth, slow motion.  Keep your arms folded across your chest. If this bothers your neck, try putting your hands behind your neck (not your head), with your elbows spread apart. ? Lie on your back with your knees bent and your feet flat on the floor. Tighten your belly muscles, and then push with your feet and raise your buttocks up a few inches. Hold this position 6 seconds as you continue to breathe normally, then lower yourself slowly to the floor. Repeat 8 to 12 times. ? If you like group exercise, try Pilates or yoga. These classes have poses that strengthen the core muscles. Lead a healthy lifestyle  · Stay at a healthy weight to avoid strain on your back. · Do not smoke. Smoking increases the risk of osteoporosis, which weakens the spine. If you need help quitting, talk to your doctor about stop-smoking programs and medicines. These can increase your chances of quitting for good. Where can you learn more? Go to http://[employer].info/. Enter L315 in the search box to learn more about \"Learning About How to Have a Healthy Back. \"  Current as of: June 26, 2019  Content Version: 12.2  © 9366-8205 [employer]. Care instructions adapted under license by [employer] (which disclaims liability or warranty for this information). If you have questions about a medical condition or this instruction, always ask your healthcare professional. [address] any warranty or liability for your use of this information.

## 2019-12-27 LAB — BACTERIA UR CULT: NORMAL

## 2020-02-06 ENCOUNTER — OFFICE VISIT (OUTPATIENT)
Dept: PRIMARY CARE CLINIC | Age: 50
End: 2020-02-06

## 2020-02-06 VITALS
TEMPERATURE: 98.4 F | BODY MASS INDEX: 29.88 KG/M2 | HEART RATE: 97 BPM | RESPIRATION RATE: 16 BRPM | SYSTOLIC BLOOD PRESSURE: 107 MMHG | OXYGEN SATURATION: 94 % | DIASTOLIC BLOOD PRESSURE: 72 MMHG | WEIGHT: 175 LBS | HEIGHT: 64 IN

## 2020-02-06 DIAGNOSIS — H93.8X3 SENSATION OF FULLNESS IN BOTH EARS: Primary | ICD-10-CM

## 2020-02-06 DIAGNOSIS — R09.81 NASAL CONGESTION: ICD-10-CM

## 2020-02-06 DIAGNOSIS — R05.9 COUGH: ICD-10-CM

## 2020-02-06 RX ORDER — FLUTICASONE PROPIONATE 50 MCG
2 SPRAY, SUSPENSION (ML) NASAL DAILY
Qty: 1 BOTTLE | Refills: 0 | Status: SHIPPED | OUTPATIENT
Start: 2020-02-06 | End: 2022-10-10

## 2020-02-06 NOTE — PROGRESS NOTES
RM 4    Chief Complaint   Patient presents with    Ear Pain     chest congestion, both ears pain and pressure x 2 days       Visit Vitals  /72 (BP 1 Location: Left arm, BP Patient Position: Sitting)   Pulse 97   Temp 98.4 °F (36.9 °C) (Oral)   Resp 16   Ht 5' 4\" (1.626 m)   Wt 175 lb (79.4 kg)   SpO2 94%   BMI 30.04 kg/m²

## 2020-02-06 NOTE — PROGRESS NOTES
HISTORY OF PRESENT ILLNESS  Katey Bull is a 52 y.o. female Patient reports nasal congestion, dry cough, and ear pain x 3 days. No fever, chills, or body aches. She has not taken anything for symptoms. Her  has had similar symptoms. Visit Vitals  /72 (BP 1 Location: Left arm, BP Patient Position: Sitting)   Pulse 97   Temp 98.4 °F (36.9 °C) (Oral)   Resp 16   Ht 5' 4\" (1.626 m)   Wt 175 lb (79.4 kg)   SpO2 94%   BMI 30.04 kg/m²       HPI    Review of Systems   Constitutional: Negative for fever. HENT: Positive for congestion and ear pain. Negative for sore throat. Respiratory: Positive for cough. Physical Exam  Constitutional:       Appearance: Normal appearance. HENT:      Head: Normocephalic. Right Ear: Hearing, ear canal and external ear normal.      Left Ear: Hearing, ear canal and external ear normal. Tympanic membrane is bulging. Ears:      Comments: Clear fluid noted behind both     Nose: Congestion present. Right Turbinates: Swollen. Left Turbinates: Swollen. Mouth/Throat:      Lips: Pink. Mouth: Mucous membranes are moist.      Pharynx: Oropharynx is clear. Cardiovascular:      Rate and Rhythm: Normal rate and regular rhythm. Pulmonary:      Effort: Pulmonary effort is normal.      Breath sounds: Normal breath sounds. Skin:     General: Skin is warm and dry. Neurological:      General: No focal deficit present. Mental Status: She is alert and oriented to person, place, and time. Psychiatric:         Mood and Affect: Mood normal.         Behavior: Behavior normal.         ASSESSMENT and PLAN    ICD-10-CM ICD-9-CM    1. Sensation of fullness in both ears H93.8X3 388.8    2. Cough R05 786.2    3.  Nasal congestion R09.81 478.19      Orders Placed This Encounter    fluticasone propionate (FLONASE) 50 mcg/actuation nasal spray   zyrtec and flonase  Motrin as needed for pain

## 2020-11-03 ENCOUNTER — TRANSCRIBE ORDER (OUTPATIENT)
Dept: SCHEDULING | Age: 50
End: 2020-11-03

## 2020-11-03 DIAGNOSIS — R10.2 VAGINAL PAIN: Primary | ICD-10-CM

## 2022-06-17 ENCOUNTER — OFFICE VISIT (OUTPATIENT)
Dept: ORTHOPEDIC SURGERY | Age: 52
End: 2022-06-17
Payer: COMMERCIAL

## 2022-06-17 VITALS — WEIGHT: 182 LBS | HEIGHT: 64 IN | BODY MASS INDEX: 31.07 KG/M2

## 2022-06-17 DIAGNOSIS — M16.11 ARTHRITIS OF RIGHT HIP: Primary | ICD-10-CM

## 2022-06-17 DIAGNOSIS — M25.551 RIGHT HIP PAIN: ICD-10-CM

## 2022-06-17 PROCEDURE — 99214 OFFICE O/P EST MOD 30 MIN: CPT | Performed by: PHYSICIAN ASSISTANT

## 2022-06-17 RX ORDER — DICLOFENAC SODIUM 75 MG/1
75 TABLET, DELAYED RELEASE ORAL 2 TIMES DAILY WITH MEALS
Qty: 60 TABLET | Refills: 0 | Status: SHIPPED | OUTPATIENT
Start: 2022-06-17 | End: 2022-10-10 | Stop reason: ALTCHOICE

## 2022-06-17 RX ORDER — METHYLPREDNISOLONE 4 MG/1
4 TABLET ORAL
Qty: 1 DOSE PACK | Refills: 0 | Status: SHIPPED | OUTPATIENT
Start: 2022-06-17 | End: 2022-10-10 | Stop reason: ALTCHOICE

## 2022-06-17 NOTE — PROGRESS NOTES
Jermaine Santana (: 1970) is a 46 y.o. female patient, here for evaluation of the following chief complaint(s):  Hip Pain (right hip pain)       ASSESSMENT/PLAN:  Below is the assessment and plan developed based on review of pertinent history, physical exam, labs, studies, and medications. 51-year-old female comes in for right hip pain. She has been struggling with this hip pain for multiple years. Continues to worsen. Affects her daily. Rates the pain as moderate to severe. Worsens the longer she is on it. Complains of anterior groin pain. Has been treated for osteoarthritis/impingement of this right hip for years conservatively. Previously this had worked but now she feels it but conservative treatment is no longer working. Discussed options with patient. She is taking a vacation at the end of this month, in order to help her get through this we will do a steroid Dosepak followed by diclofenac. Risks and benefits discussed with patient. Patient verbalizes understanding. Next month she would like to move forward with a right total hip replacement. Risks and benefits of joint arthroplasty discussed at length including but not limited to bleeding, need for blood transfusion, infection, damage to surrounding structures, intraoperative fracture, blood clots, pulmonary embolism, death. The patient understands the risks of surgery. All questions answered. We elected to move forward. Plans to schedule at her earliest convenience. Case discussed with Dr. Christiano Mark who is in agreement with plan of care. 1. Arthritis of right hip  2. Right hip pain  -     XR HIP RT W OR WO PELV 2-3 VWS; Future      Encounter Diagnoses   Name Primary?  Right hip pain     Arthritis of right hip Yes        No follow-ups on file.       SUBJECTIVE/OBJECTIVE:  Jermaine Santana (: 1970) is a 46 y.o. female who presents today for the following:  Chief Complaint   Patient presents with    Hip Pain right hip pain       25-year-old female comes in for right hip pain. She has been struggling with this hip pain for multiple years. Continues to worsen. Affects her daily. Rates the pain as moderate to severe. Worsens the longer she is on it. Complains of anterior groin pain. Has been treated for osteoarthritis/impingement of this right hip for years conservatively. Previously this had worked but now she feels it but conservative treatment is no longer working. IMAGING:  XR Results (most recent):  Results from Appointment encounter on 06/17/22    XR HIP RT W OR WO PELV 2-3 VWS    Narrative  3 views right hip x-rays ordered personally reviewed. Patient has moderate to severe osteoarthritic changes noted of the right hip. Overall joint space appears relatively well-preserved however she has subchondral sclerosis and subchondral cyst present. Osteophyte formation noted at the superior and inferior femoral head. Impingement present. Allergies   Allergen Reactions    Prochlorperazine Anaphylaxis and Unknown (comments)    Sulfa (Sulfonamide Antibiotics) Anaphylaxis and Rash       Current Outpatient Medications   Medication Sig    methylPREDNISolone (MEDROL DOSEPACK) 4 mg tablet Take 1 Tablet by mouth Specific Days and Specific Times. Per dose pack instructions    diclofenac EC (VOLTAREN) 75 mg EC tablet Take 1 Tablet by mouth two (2) times daily (with meals).  fluticasone propionate (FLONASE) 50 mcg/actuation nasal spray 2 Sprays by Both Nostrils route daily.  butalbital-acetaminophen-caffeine (FIORICET, ESGIC) -40 mg per tablet Take 1 Tab by mouth.  fexofenadine (ALLEGRA ALLERGY) 180 mg tablet Take 180 mg by mouth.  hydroCHLOROthiazide (HYDRODIURIL) 25 mg tablet Take 25 mg by mouth.  norethindrone ac-eth estradiol (LOESTRIN 1.5/30, 21,) 1.5-30 mg-mcg tab Take 1 Tab by mouth.     triamcinolone acetonide (KENALOG) 0.1 % topical cream     valsartan-hydroCHLOROthiazide (DIOVAN-HCT) 160-12.5 mg per tablet     norethindrone (VARINDER) 0.35 mg tab Varinder 0.35 mg tablet   Take 1 tablet every day by oral route.  potassium chloride (KLOR-CON M10) 10 mEq tablet Take  by mouth.  chlorthalidone (HYGROTEN) 25 mg tablet 25 mg. Indications: only taking half of tablet    neomycin-polymyxin-dexamethasone (MAXITROL) ophthalmic suspension Administer 1 Drop to both eyes two (2) times a day.  fluticasone (FLONASE) 50 mcg/actuation nasal spray 2 Sprays by Both Nostrils route daily.  PROAIR HFA 90 mcg/actuation inhaler     amLODIPine (NORVASC) 5 mg tablet     lansoprazole (PREVACID) 30 mg capsule     montelukast (SINGULAIR) 10 mg tablet Take 10 mg by mouth daily. No current facility-administered medications for this visit. Past Medical History:   Diagnosis Date    Hypertension         Past Surgical History:   Procedure Laterality Date    HX HEENT      lasix       Family History   Problem Relation Age of Onset    Hypertension Mother     No Known Problems Father     No Known Problems Sister     No Known Problems Brother     No Known Problems Maternal Aunt     No Known Problems Maternal Uncle     No Known Problems Paternal Aunt     No Known Problems Paternal Uncle     No Known Problems Maternal Grandmother     No Known Problems Maternal Grandfather     No Known Problems Paternal Grandmother     No Known Problems Paternal Grandfather         Social History     Tobacco Use    Smoking status: Never Smoker    Smokeless tobacco: Never Used   Substance Use Topics    Alcohol use: No        All systems reviewed x 12 and were negative with the exception of None      No flowsheet data found. Vitals:  Ht 5' 4\" (1.626 m)   Wt 182 lb (82.6 kg)   BMI 31.24 kg/m²    Body mass index is 31.24 kg/m². Physical Exam    General: NAD, well developed, well nourished. Cardiac: Extremities well perfused. Respiratory: Nonlabored breathing. RLE: Mild antalgic gait. Positive stinchfield. 0-100 degrees of flexion. >20 degrees internal rotation, >30 degrees external rotation. Negative ASAEL. Pain with flexion adduction and internal rotation. .  Motor strength grossly intact. LLE: No antalgic gait. Negative stinchfield. 0-100 degrees of flexion. >20 degrees internal rotation, >30 degrees external rotation. Negative ASAEL. No pain with flexion adduction and internal rotation. .  Motor strength grossly intact. Skin: Warm well perfused. Vascular: Palpable pedal pulses bilaterally. Equal. Capillary refill less than 2 seconds. Olesya Contreras M.D. was available for immediate consultation as the supervising physician. An electronic signature was used to authenticate this note.   -- Bartolo Valdovinos PA-C

## 2022-06-20 DIAGNOSIS — M16.11 ARTHRITIS OF RIGHT HIP: Primary | ICD-10-CM

## 2022-07-18 DIAGNOSIS — M16.11 ARTHRITIS OF RIGHT HIP: Primary | ICD-10-CM

## 2022-10-10 ENCOUNTER — OFFICE VISIT (OUTPATIENT)
Dept: PRIMARY CARE CLINIC | Age: 52
End: 2022-10-10

## 2022-10-10 VITALS
HEART RATE: 80 BPM | DIASTOLIC BLOOD PRESSURE: 85 MMHG | WEIGHT: 186.8 LBS | TEMPERATURE: 97.4 F | RESPIRATION RATE: 20 BRPM | BODY MASS INDEX: 31.89 KG/M2 | HEIGHT: 64 IN | SYSTOLIC BLOOD PRESSURE: 135 MMHG | OXYGEN SATURATION: 96 %

## 2022-10-10 DIAGNOSIS — M25.512 ACUTE PAIN OF LEFT SHOULDER: Primary | ICD-10-CM

## 2022-10-10 PROCEDURE — 99213 OFFICE O/P EST LOW 20 MIN: CPT | Performed by: NURSE PRACTITIONER

## 2022-10-10 RX ORDER — CETIRIZINE HYDROCHLORIDE 10 MG/1
CAPSULE, LIQUID FILLED ORAL
COMMUNITY

## 2022-10-10 RX ORDER — CYCLOBENZAPRINE HCL 10 MG
10 TABLET ORAL
Qty: 14 TABLET | Refills: 0 | Status: SHIPPED | OUTPATIENT
Start: 2022-10-10 | End: 2022-10-24

## 2022-10-10 RX ORDER — DICLOFENAC SODIUM 75 MG/1
75 TABLET, DELAYED RELEASE ORAL 2 TIMES DAILY
Qty: 28 TABLET | Refills: 0 | Status: SHIPPED | OUTPATIENT
Start: 2022-10-10 | End: 2022-10-24

## 2022-10-10 RX ORDER — ACETAMINOPHEN AND CODEINE PHOSPHATE 120; 12 MG/5ML; MG/5ML
SOLUTION ORAL
COMMUNITY

## 2022-10-10 RX ORDER — DICLOFENAC SODIUM 10 MG/G
GEL TOPICAL
COMMUNITY
Start: 2022-09-12

## 2022-10-10 NOTE — PATIENT INSTRUCTIONS
- Advised to take medications as prescribed, avoid taking additional NSAIDS but may add tylenol as needed  - Alternate ice and warm moist heat  - start with gentle stretch, complete rehab exercises

## 2022-10-10 NOTE — PROGRESS NOTES
Chief Complaint   Patient presents with    Shoulder Pain     L shoulder pain started this am.  No injury noted     Visit Vitals  /85   Pulse 80   Temp 97.4 °F (36.3 °C) (Temporal)   Resp 20   Ht 5' 4\" (1.626 m)   Wt 186 lb 12.8 oz (84.7 kg)   LMP  (LMP Unknown)   SpO2 96%   BMI 32.06 kg/m²

## 2022-10-10 NOTE — PROGRESS NOTES
Chief Complaint   Patient presents with    Shoulder Pain     L shoulder pain started this am.  No injury noted   HISTORY OF PRESENT ILLNESS  Chris Sanabria is a 46 y.o. female. She is here for left shoulder pain onset 0945 today. She denies injury. She applied ice 20 minutes at a time few times today. She took 400mg ibuprofen, helped some. Movement makes worse. 7/10. No prior injuries    Review of Systems   Constitutional: Negative. Musculoskeletal:  Positive for joint pain. Past Medical History:   Diagnosis Date    Hypertension      Past Surgical History:   Procedure Laterality Date    HX HEENT      lasix     Physical Exam  Vitals and nursing note reviewed. Cardiovascular:      Rate and Rhythm: Normal rate. Pulmonary:      Effort: Pulmonary effort is normal.   Musculoskeletal:      Left shoulder: No swelling or deformity. Normal range of motion. Normal strength. Cervical back: Neck supple. Comments: TTP over left acromion process; distal pulses, sensation, and pulses intact   Neurological:      Mental Status: She is alert. Visit Vitals  /85   Pulse 80   Temp 97.4 °F (36.3 °C) (Temporal)   Resp 20   Ht 5' 4\" (1.626 m)   Wt 186 lb 12.8 oz (84.7 kg)   SpO2 96%   BMI 32.06 kg/m²   ASSESSMENT and PLAN    ICD-10-CM ICD-9-CM    1. Acute pain of left shoulder  M25.512 719.41         Encounter Diagnoses   Name Primary?     Acute pain of left shoulder Yes     Orders Placed This Encounter    norethindrone (MICRONOR) 0.35 mg tab    Cetirizine (ZyrTEC) 10 mg cap    diclofenac (VOLTAREN) 1 % gel    diclofenac EC (VOLTAREN) 75 mg EC tablet    cyclobenzaprine (FLEXERIL) 10 mg tablet   - Advised to take medications as prescribed, avoid taking additional NSAIDS but may add tylenol as needed  - Alternate ice and warm moist heat  - start with gentle stretch, complete rehab exercises   She has appt with Ortho next week  Signed By: STEVEN Matthew     October 10, 2022

## 2022-11-17 ENCOUNTER — TRANSCRIBE ORDER (OUTPATIENT)
Dept: REGISTRATION | Age: 52
End: 2022-11-17

## 2022-11-17 DIAGNOSIS — M54.50 ACUTE MIDLINE LOW BACK PAIN WITHOUT SCIATICA: Primary | ICD-10-CM

## 2022-11-17 DIAGNOSIS — M79.644 PAIN OF RIGHT MIDDLE FINGER: ICD-10-CM

## 2022-12-03 ENCOUNTER — OFFICE VISIT (OUTPATIENT)
Dept: PRIMARY CARE CLINIC | Age: 52
End: 2022-12-03

## 2022-12-03 VITALS
BODY MASS INDEX: 31.99 KG/M2 | RESPIRATION RATE: 18 BRPM | TEMPERATURE: 98.1 F | HEIGHT: 64 IN | OXYGEN SATURATION: 94 % | DIASTOLIC BLOOD PRESSURE: 75 MMHG | HEART RATE: 79 BPM | WEIGHT: 187.4 LBS | SYSTOLIC BLOOD PRESSURE: 111 MMHG

## 2022-12-03 DIAGNOSIS — B96.89 BACTERIAL CONJUNCTIVITIS OF BOTH EYES: Primary | ICD-10-CM

## 2022-12-03 DIAGNOSIS — H10.9 BACTERIAL CONJUNCTIVITIS OF BOTH EYES: Primary | ICD-10-CM

## 2022-12-03 RX ORDER — ERGOCALCIFEROL 1.25 MG/1
CAPSULE ORAL
COMMUNITY
Start: 2022-11-22

## 2022-12-03 RX ORDER — CIPROFLOXACIN HYDROCHLORIDE 3.5 MG/ML
SOLUTION/ DROPS TOPICAL
Qty: 5 ML | Refills: 0 | Status: SHIPPED | OUTPATIENT
Start: 2022-12-03

## 2022-12-03 NOTE — PROGRESS NOTES
Chief Complaint   Patient presents with    Eye Problem     Right eye red with some crustiness this AM. No pain         1. \"Have you been to the ER, urgent care clinic since your last visit? Hospitalized since your last visit? \" No    2. \"Have you seen or consulted any other health care providers outside of the [address] [place] [address] since your last visit? \" No            3 most recent PHQ Screens 12/3/2022   Little interest or pleasure in doing things Not at all   Feeling down, depressed, irritable, or hopeless Not at all   Total Score PHQ 2 0       Health Maintenance Due   Topic Date Due    Hepatitis C Screening  Never done    COVID-19 Vaccine (1) Never done    DTaP/Tdap/Td series (1 - Tdap) Never done    Cervical cancer screen  Never done    Lipid Screen  Never done    Colorectal Cancer Screening Combo  Never done    Shingrix Vaccine Age 50> (1 of 2) Never done    Breast Cancer Screen Mammogram  Never done    Flu Vaccine (1) Never done

## 2022-12-03 NOTE — PROGRESS NOTES
Chief Complaint   Patient presents with    Eye Problem     Right eye red with some crustiness this AM. No pain       HPI:  46year old female who presents with one day history of right eye a little red and had some crusting this morning. 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 No vision changes or pain per se. Review of Systems - no recent weight loss/gain, fevers, chills, chest pain, shortness of breath, cough, nausea, vomiting, diarrhea, urinary frequency/urgency/dysuria, or rashes.   Otherwise, ROS negative except as per HPI    Past Medical History:   Diagnosis Date    Hypertension        Past Surgical History:   Procedure Laterality Date    HX HEENT      lasix    HX HERNIA REPAIR N/A        Family History   Problem Relation Age of Onset    Hypertension Mother     Heart Failure Father     Hypertension Father     No Known Problems Sister     Diabetes Brother     Heart Failure Brother     No Known Problems Maternal Grandmother     No Known Problems Maternal Grandfather     No Known Problems Paternal Grandmother     No Known Problems Paternal Grandfather     No Known Problems Maternal Aunt     No Known Problems Maternal Uncle     No Known Problems Paternal Aunt     No Known Problems Paternal Uncle        Social History     Socioeconomic History    Marital status:      Spouse name: Not on file    Number of children: Not on file    Years of education: Not on file    Highest education level: Not on file   Occupational History    Not on file   Tobacco Use    Smoking status: Never     Passive exposure: Never    Smokeless tobacco: Never   Vaping Use    Vaping Use: Never used   Substance and Sexual Activity    Alcohol use: No    Drug use: No    Sexual activity: Yes     Partners: Male     Birth control/protection: Pill     Comment: Dimitri   Other Topics Concern    Not on file   Social History Narrative    Not on file     Social Determinants of Health Financial Resource Strain: Not on file   Food Insecurity: Not on file   Transportation Needs: Not on file   Physical Activity: Not on file   Stress: Not on file   Social Connections: Not on file   Intimate Partner Violence: Not on file   Housing Stability: Not on file       Current Outpatient Medications on File Prior to Visit   Medication Sig Dispense Refill    ergocalciferol (ERGOCALCIFEROL) 1,250 mcg (50,000 unit) capsule TAKE 1 CAPSULE ORALLY ONCE A WEEK FOR VITAMIN D FOR 90 DAYS      norethindrone (MICRONOR) 0.35 mg tab       Cetirizine (ZyrTEC) 10 mg cap Zyrtec 10 mg capsule   Take by oral route. valsartan-hydroCHLOROthiazide (DIOVAN-HCT) 160-12.5 mg per tablet       fluticasone (FLONASE) 50 mcg/actuation nasal spray 2 Sprays by Both Nostrils route daily. 1 Bottle 0    amLODIPine (NORVASC) 5 mg tablet   11    lansoprazole (PREVACID) 30 mg capsule   6    [DISCONTINUED] diclofenac (VOLTAREN) 1 % gel PLEASE APPLY TO EFFECTIVE AREA      [DISCONTINUED] triamcinolone acetonide (KENALOG) 0.1 % topical cream  (Patient not taking: Reported on 12/3/2022)       No current facility-administered medications on file prior to visit. Allergies   Allergen Reactions    Prochlorperazine Anaphylaxis and Unknown (comments)    Sulfa (Sulfonamide Antibiotics) Anaphylaxis and Rash       PE:    General:  Well-developed, well-nourished female in no apparent distress  HEENT:  Normocephalic, atraumatic, Pupils are equal, round, & reactive to light & accommodation. Extraocular movements intact. Palpebral conjunctivae are red & irritated with a cobblestone appearance bilaterally. TM's normal, external auditory exam normal.  Oropharynx grossly normal.  No tonsillar enlargement, erythema, or exudates. Neck:  Supple, nontender, full ROM. No lymphadenopathy. No thyromegaly. Chest:  clear to auscultation without rales, rhonchi, or wheezes heard.   CV:  Regular rate & rhythm without murmurs, gallops, clicks, or rubs.  Abdomen:  soft, nontender, nondistended, normoactive bowel sounds, no organomegaly. Extremities:  No edema, clubbing, or cyanosis. Full ROM, nontender. Orders Placed This Encounter    ergocalciferol (ERGOCALCIFEROL) 1,250 mcg (50,000 unit) capsule     Sig: TAKE 1 CAPSULE ORALLY ONCE A WEEK FOR VITAMIN D FOR 90 DAYS    ciprofloxacin HCl (Ciloxan) 0.3 % ophthalmic solution     Sig: Instill 1 to 2 drops into the conjunctival sac every 2 hours while awake for 2 days and 1 to 2 drops every 4 hours while awake for the next 5 days. Dispense:  5 mL     Refill:  0           1. Bacterial conjunctivitis of both eyes    - ciprofloxacin HCl (Ciloxan) 0.3 % ophthalmic solution; Instill 1 to 2 drops into the conjunctival sac every 2 hours while awake for 2 days and 1 to 2 drops every 4 hours while awake for the next 5 days. Dispense: 5 mL; Refill: 0    Follow-up and Dispositions    Return if symptoms worsen or fail to improve.          Masoud Vega MD

## 2022-12-16 ENCOUNTER — OFFICE VISIT (OUTPATIENT)
Dept: PRIMARY CARE CLINIC | Age: 52
End: 2022-12-16

## 2022-12-16 VITALS
WEIGHT: 185.4 LBS | SYSTOLIC BLOOD PRESSURE: 135 MMHG | HEIGHT: 64 IN | TEMPERATURE: 97.8 F | DIASTOLIC BLOOD PRESSURE: 90 MMHG | OXYGEN SATURATION: 98 % | BODY MASS INDEX: 31.65 KG/M2 | HEART RATE: 92 BPM | RESPIRATION RATE: 16 BRPM

## 2022-12-16 DIAGNOSIS — U07.1 COVID-19: Primary | ICD-10-CM

## 2022-12-16 DIAGNOSIS — B34.9 VIRAL ILLNESS: ICD-10-CM

## 2022-12-16 LAB
FLUAV+FLUBV AG NOSE QL IA.RAPID: NEGATIVE
FLUAV+FLUBV AG NOSE QL IA.RAPID: NEGATIVE
S PYO AG THROAT QL: NEGATIVE
SARS-COV-2 PCR, POC: POSITIVE
VALID INTERNAL CONTROL?: YES
VALID INTERNAL CONTROL?: YES

## 2022-12-17 NOTE — PATIENT INSTRUCTIONS
To treat a sore throat you should take mild pain medicine like acetaminophen or ibuprofen. Increase your fluids, eat a soft diet and do not smoke. Gargling with warm water or salt water (1 tsp. Salt in 8 oz. Water) can be helpful. Throat sprays and lozenges or sucking on hard candy will also ease the symptoms.

## 2022-12-17 NOTE — PROGRESS NOTES
Chief Complaint   Patient presents with    Cold Symptoms     Scratchy throat, both ears painful. HISTORY OF PRESENT ILLNESS  Johanna Kowalski is a 46 y.o. female. Patient reports bilateral ear pain and sore throat onset yesterday. OTC emercy C and 325 tylenol last night. 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 She denies cough or congestion, no shortness of breath. She denies fever, chills, or body aches. Review of Systems   Constitutional: Negative. HENT:  Positive for ear pain and sore throat. Respiratory: Negative. Cardiovascular: Negative. Gastrointestinal: Negative. Neurological: Negative. Physical Exam  Vitals and nursing note reviewed. HENT:      Right Ear: Tympanic membrane normal.      Left Ear: Tympanic membrane normal.      Mouth/Throat:      Pharynx: Oropharynx is clear. No oropharyngeal exudate or posterior oropharyngeal erythema. Cardiovascular:      Rate and Rhythm: Normal rate. Pulmonary:      Effort: Pulmonary effort is normal. No respiratory distress. Breath sounds: Normal breath sounds. Musculoskeletal:      Cervical back: Neck supple. Neurological:      Mental Status: She is alert.      Past Medical History:   Diagnosis Date    Hypertension      Past Surgical History:   Procedure Laterality Date    HX HEENT      lasix    HX HERNIA REPAIR N/A      BP (!) 135/90 (BP 1 Location: Left arm, BP Patient Position: Sitting, BP Cuff Size: Adult)   Pulse 92   Temp 97.8 °F (36.6 °C) (Temporal)   Resp 16   Ht 5' 4\" (1.626 m)   Wt 185 lb 6.4 oz (84.1 kg)   SpO2 98%   BMI 31.82 kg/m²   Results for orders placed or performed in visit on 12/16/22   AMB POC SHERRIE INFLUENZA A/B TEST   Result Value Ref Range    VALID INTERNAL CONTROL POC Yes     Influenza A Ag POC Negative Negative    Influenza B Ag POC Negative Negative   AMB POC RAPID STREP A   Result Value Ref Range    VALID INTERNAL CONTROL POC Yes     Group A Strep Ag Negative Negative   POCT COVID-19, SARS-COV-2, PCR   Result Value Ref Range    SARS-COV-2 PCR, POC Positive (A) Negative        ASSESSMENT and PLAN  1. COVID-19  2. Viral illness  -     AMB POC SHERRIE INFLUENZA A/B TEST  -     AMB POC RAPID STREP A  -     POCT COVID-19, SARS-COV-2, PCR  Discussed [facility] recommendations for isolation and mask wearing. Recommended rest, push fluids, and may take OTC Mucinex or decongestants for symptom relief. Encouraged to take tylenol or ibuprofen for fever or discomfort as needed. Instructed to go to the nearest ED for difficulty breathing, shortness of breath, or can not tolerate PO.        STEVEN Donaldson

## 2022-12-17 NOTE — PROGRESS NOTES
Identified pt with two pt identifiers(name and ). Reviewed record in preparation for visit and have obtained necessary documentation. Chief Complaint   Patient presents with    Cold Symptoms     Scratchy throat, both ears painful. Vitals:    227   BP: (!) 135/90   Pulse: 92   Resp: 16   Temp: 97.8 °F (36.6 °C)   TempSrc: Temporal   SpO2: 98%   Weight: 185 lb 6.4 oz (84.1 kg)   Height: 5' 4\" (1.626 m)   PainSc:   4       Health Maintenance Due   Topic    Hepatitis C Screening     COVID-19 Vaccine (1)    DTaP/Tdap/Td series (1 - Tdap)    Cervical cancer screen     Lipid Screen     Colorectal Cancer Screening Combo     Shingrix Vaccine Age 50> (1 of 2)    Breast Cancer Screen Mammogram     Flu Vaccine (1)       Coordination of Care Questionnaire:  :   1) Have you been to an emergency room, urgent care, or hospitalized since your last visit? If yes, where when, and reason for visit? no       2. Have seen or consulted any other health care provider since your last visit? If yes, where when, and reason for visit? NO      Patient is accompanied by self I have received verbal consent from Alix Zuleta to discuss any/all medical information while they are present in the room. An electronic signature was used to authenticate this note.   -- Betsy Negrete LPN

## 2023-01-21 ENCOUNTER — OFFICE VISIT (OUTPATIENT)
Dept: PRIMARY CARE CLINIC | Age: 53
End: 2023-01-21

## 2023-01-21 VITALS
HEIGHT: 64 IN | TEMPERATURE: 97.7 F | OXYGEN SATURATION: 95 % | HEART RATE: 64 BPM | BODY MASS INDEX: 31.99 KG/M2 | SYSTOLIC BLOOD PRESSURE: 106 MMHG | WEIGHT: 187.4 LBS | RESPIRATION RATE: 18 BRPM | DIASTOLIC BLOOD PRESSURE: 70 MMHG

## 2023-01-21 DIAGNOSIS — H69.82 EUSTACHIAN TUBE DYSFUNCTION, LEFT: Primary | ICD-10-CM

## 2023-01-21 RX ORDER — METHYLPREDNISOLONE 4 MG/1
TABLET ORAL
Qty: 1 DOSE PACK | Refills: 0 | Status: SHIPPED | OUTPATIENT
Start: 2023-01-21

## 2023-01-21 NOTE — PROGRESS NOTES
Chief Complaint   Patient presents with    Sinus Pain     Apox x 2 days     HISTORY OF PRESENT ILLNESS  Whit Stone is a 46 y.o. female. Patient reports nasal congestion and pain over eyes x2 days ago. Reports using tylenol intermittently with improvement in pain. Denies fever or sick contacts. Denies SOB/chest pain. Review of Systems   Constitutional:  Negative for chills and fever. HENT:  Positive for congestion and sinus pain. Eyes: Negative. Respiratory:  Negative for shortness of breath. Cardiovascular: Negative. Gastrointestinal: Negative. Genitourinary: Negative. Musculoskeletal: Negative. Skin: Negative. Neurological:  Negative for loss of consciousness. Endo/Heme/Allergies: Negative. Psychiatric/Behavioral: Negative. Physical Exam  Constitutional:       Appearance: Normal appearance. She is well-developed and well-groomed. HENT:      Head: Normocephalic. Right Ear: Tympanic membrane normal.      Left Ear: No tenderness. A middle ear effusion is present. Nose: Nose normal.      Mouth/Throat:      Mouth: Mucous membranes are moist.      Pharynx: Oropharynx is clear. Eyes:      Pupils: Pupils are equal, round, and reactive to light. Cardiovascular:      Rate and Rhythm: Normal rate. Pulses: Normal pulses. Heart sounds: Normal heart sounds. Pulmonary:      Effort: Pulmonary effort is normal.      Breath sounds: Normal breath sounds. Abdominal:      Palpations: Abdomen is soft. Musculoskeletal:         General: Normal range of motion. Cervical back: Normal range of motion and neck supple. Skin:     General: Skin is warm. Neurological:      General: No focal deficit present. Mental Status: She is alert and oriented to person, place, and time.    Psychiatric:         Mood and Affect: Mood normal.         Behavior: Behavior normal.     Past Medical History:   Diagnosis Date    Hypertension      Past Surgical History: Procedure Laterality Date    HX HEENT      lasix    HX HERNIA REPAIR N/A      /70 (BP 1 Location: Left upper arm, BP Patient Position: Sitting, BP Cuff Size: Adult long)   Pulse 64   Temp 97.7 °F (36.5 °C) (Temporal)   Resp 18   Ht 5' 4\" (1.626 m)   Wt 187 lb 6.4 oz (85 kg)   SpO2 95%   BMI 32.17 kg/m²     ASSESSMENT and PLAN  1. Eustachian tube dysfunction, left  -     methylPREDNISolone (MEDROL DOSEPACK) 4 mg tablet; Take as directed., Normal, Disp-1 Dose Pack, R-0 - no NSAIDS        - OTC benadryl & Nasacort for sinus congestion, tylenol scheduled for pain. - Discussed with patient likely viral illness that should resolve on it's own. Recommended rest, push fluids, humidified air. Instructed to seek additional care if does not resolve or symptoms worsens.      Marybeth Hercules NP

## 2023-01-21 NOTE — PROGRESS NOTES
Chief Complaint   Patient presents with    Sinus Pain     Apox x 2 days         1. \"Have you been to the ER, urgent care clinic since your last visit? Hospitalized since your last visit? \" No    2. \"Have you seen or consulted any other health care providers outside of the [address] [place] [address] since your last visit? \" No          3 most recent PHQ Screens 1/21/2023   Little interest or pleasure in doing things Not at all   Feeling down, depressed, irritable, or hopeless Not at all   Total Score PHQ 2 0       Health Maintenance Due   Topic Date Due    Hepatitis C Screening  Never done    COVID-19 Vaccine (1) Never done    DTaP/Tdap/Td series (1 - Tdap) Never done    Cervical cancer screen  Never done    Colorectal Cancer Screening Combo  Never done    Shingles Vaccine (1 of 2) Never done    Breast Cancer Screen Mammogram  Never done    Flu Vaccine (1) 08/01/2022

## 2023-01-21 NOTE — PATIENT INSTRUCTIONS
-  Benadryl at night while taking steroids (25mg can do 50mg if not seeing results). - Nasacort as directed for sinus congestion/pain. - Do not take NSAIDS (motrin, aleve, naproxen etc.) while on steroids. Take steroids in the morning, will keep you up at night.    - take OTC medication tylenol for pain/discomfort.      -  rest/ push fluids    -  humidified air

## 2023-03-07 ENCOUNTER — OFFICE VISIT (OUTPATIENT)
Dept: PRIMARY CARE CLINIC | Age: 53
End: 2023-03-07

## 2023-03-07 VITALS
BODY MASS INDEX: 32.33 KG/M2 | RESPIRATION RATE: 18 BRPM | HEART RATE: 90 BPM | TEMPERATURE: 98.6 F | OXYGEN SATURATION: 97 % | WEIGHT: 189.4 LBS | DIASTOLIC BLOOD PRESSURE: 83 MMHG | HEIGHT: 64 IN | SYSTOLIC BLOOD PRESSURE: 120 MMHG

## 2023-03-07 DIAGNOSIS — Z20.818 STREP THROAT EXPOSURE: ICD-10-CM

## 2023-03-07 DIAGNOSIS — J02.9 SORE THROAT: Primary | ICD-10-CM

## 2023-03-07 LAB
S PYO AG THROAT QL: NEGATIVE
VALID INTERNAL CONTROL?: YES

## 2023-03-07 RX ORDER — PENICILLIN V POTASSIUM 500 MG/1
500 TABLET, FILM COATED ORAL 2 TIMES DAILY
Qty: 20 TABLET | Refills: 0 | Status: SHIPPED | OUTPATIENT
Start: 2023-03-07 | End: 2023-03-11

## 2023-03-07 NOTE — LETTER
NOTIFICATION RETURN TO WORK / SCHOOL    3/7/2023 7:27 PM    Ms. Floridalma Carrion  [place] [id number]      To Whom It May Concern:    Floridalma Carrion is currently under the care of [address]. She will return to work/school once 24 hours free of symptoms. If there are questions or concerns please have the patient contact our office.         Sincerely,      Theodore Khan NP

## 2023-03-08 NOTE — PROGRESS NOTES
Identified pt with two pt identifiers(name and ). Reviewed record in preparation for visit and have obtained necessary documentation. Chief Complaint   Patient presents with    Sore Throat     X3-4 days; cough/congestion/ears painful with swallowing/post nasal drip; no home covid testing      Vitals:    23 1857   BP: 120/83   Pulse: 90   Resp: 18   Temp: 98.6 °F (37 °C)   TempSrc: Temporal   SpO2: 97%   Weight: 189 lb 6.4 oz (85.9 kg)   Height: 5' 4\" (1.626 m)   PainSc:   4   PainLoc: Throat       Health Maintenance Review: Patient reminded of \"due or due soon\" health maintenance. I have asked the patient to contact his/her primary care provider (PCP) for follow-up on his/her health maintenance. Coordination of Care Questionnaire:  :   1) Have you been to an emergency room, urgent care, or hospitalized since your last visit? If yes, where when, and reason for visit? no       2. Have seen or consulted any other health care provider since your last visit? If yes, where when, and reason for visit? NO      Patient is accompanied by self I have received verbal consent from Ovi Casarez to discuss any/all medical information while they are present in the room.

## 2023-03-08 NOTE — PROGRESS NOTES
HISTORY OF PRESENT ILLNESS  Kimberlyn He is a 46 y.o. female. Chief Complaint   Patient presents with    Sore Throat     X3-4 days; cough/congestion/ears painful with swallowing/post nasal drip; no home covid testing   Patient reports  gradual pain to throat and that she is just pushing fluids for symptoms without improvement. Denies fevers. 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 Review of Systems   HENT:  Positive for congestion, ear pain (bilateral ear pain when swallowing) and sore throat. Respiratory:  Positive for cough. All other systems reviewed and are negative. Physical Exam  Constitutional:       Appearance: Normal appearance. She is well-developed and normal weight. She is not ill-appearing. HENT:      Head: Normocephalic. Right Ear: Tympanic membrane, ear canal and external ear normal.      Left Ear: Tympanic membrane, ear canal and external ear normal.      Nose: Congestion present. Right Sinus: No maxillary sinus tenderness or frontal sinus tenderness. Left Sinus: No maxillary sinus tenderness or frontal sinus tenderness. Mouth/Throat:      Lips: Pink. Mouth: Mucous membranes are moist.      Pharynx: Posterior oropharyngeal erythema present. No oropharyngeal exudate. Tonsils: No tonsillar exudate. 2+ on the right. 2+ on the left. Cardiovascular:      Rate and Rhythm: Normal rate. Pulses: Normal pulses. Heart sounds: Normal heart sounds. Pulmonary:      Effort: Pulmonary effort is normal.      Breath sounds: Normal breath sounds. Lymphadenopathy:      Cervical: Cervical adenopathy present.      Past Medical History:   Diagnosis Date    Hypertension      Past Surgical History:   Procedure Laterality Date    HX HEENT      lasix    HX HERNIA REPAIR N/A      /83 (BP 1 Location: Left arm, BP Patient Position: Sitting)   Pulse 90   Temp 98.6 °F (37 °C) (Temporal)   Resp 18   Ht 5' 4\" (1.626 m)   Wt 189 lb 6.4 oz (85.9 kg)   SpO2 97%   BMI 32.51 kg/m²     Results for orders placed or performed in visit on 03/07/23   AMB POC RAPID STREP A   Result Value Ref Range    VALID INTERNAL CONTROL POC Yes     Group A Strep Ag Negative Negative     ASSESSMENT and PLAN  1. Sore throat  -     AMB POC RAPID STREP A  2. Strep throat exposure  -     STREP GP A AG, IA W/REFLEX  -     penicillin v potassium (VEETID) 500 mg tablet; Take 1 Tablet by mouth two (2) times a day for 10 days. , Normal, Disp-20 Tablet, R-0        - Discussed with patient presumptive treatment for strep course. Recommended: rest, push fluids, and take OTC tylenol or ibuprofen for fever or symptom relief as needed. Additionally encouraged salt water gargles. Instructed to seek additional care if does not resolve or symptoms worsens.      Michelle Johansen, ADRIANE

## 2023-03-11 ENCOUNTER — OFFICE VISIT (OUTPATIENT)
Dept: PRIMARY CARE CLINIC | Age: 53
End: 2023-03-11

## 2023-03-11 VITALS
HEIGHT: 64 IN | SYSTOLIC BLOOD PRESSURE: 115 MMHG | RESPIRATION RATE: 18 BRPM | DIASTOLIC BLOOD PRESSURE: 77 MMHG | HEART RATE: 88 BPM | OXYGEN SATURATION: 96 % | WEIGHT: 185.2 LBS | TEMPERATURE: 97.9 F | BODY MASS INDEX: 31.62 KG/M2

## 2023-03-11 DIAGNOSIS — H66.91 ACUTE OTITIS MEDIA, RIGHT: Primary | ICD-10-CM

## 2023-03-11 DIAGNOSIS — R05.1 ACUTE COUGH: ICD-10-CM

## 2023-03-11 LAB
LOT NUMBER POC: NORMAL
SARS-COV-2 PCR, POC: NEGATIVE
VALID INTERNAL CONTROL?: YES

## 2023-03-11 RX ORDER — IPRATROPIUM BROMIDE 42 UG/1
SPRAY, METERED NASAL
COMMUNITY
Start: 2023-01-31

## 2023-03-11 RX ORDER — AMOXICILLIN AND CLAVULANATE POTASSIUM 875; 125 MG/1; MG/1
1 TABLET, FILM COATED ORAL 2 TIMES DAILY
Qty: 20 TABLET | Refills: 0 | Status: SHIPPED | OUTPATIENT
Start: 2023-03-11 | End: 2023-03-21

## 2023-03-11 NOTE — PROGRESS NOTES
Chief Complaint   Patient presents with    Ear Pain     Apox x 7 days     HISTORY OF PRESENT ILLNESS  Christiano Barrientos is a 46 y.o. female. She is here with c/o side effects from pencillin. She reports she was dx with possible strep on 3/7 and treated with PCN. She reports nausea and feeling jittery. She reports she continues with cough, nasal congestion, and bilateral ear pain. She reports cough is worse and worse in the morning. Onset 7 days of symptoms    Review of Systems   Constitutional: Negative. HENT:  Positive for congestion and ear pain. Respiratory:  Positive for cough. Negative for shortness of breath and wheezing. Cardiovascular: Negative. Gastrointestinal: Negative. Physical Exam  Vitals and nursing note reviewed. HENT:      Right Ear: A middle ear effusion is present. Tympanic membrane is erythematous and bulging. Left Ear: Tympanic membrane is injected. Ears:      Comments: No localized LAD     Nose: Congestion and rhinorrhea present. Mouth/Throat:      Pharynx: Oropharynx is clear. No oropharyngeal exudate or posterior oropharyngeal erythema. Cardiovascular:      Rate and Rhythm: Normal rate. Pulmonary:      Effort: Pulmonary effort is normal. No respiratory distress. Breath sounds: Normal breath sounds. Musculoskeletal:      Cervical back: Neck supple. Neurological:      Mental Status: She is alert.      Past Medical History:   Diagnosis Date    Hypertension      Past Surgical History:   Procedure Laterality Date    HX HEENT      lasix    HX HERNIA REPAIR N/A      /77 (BP 1 Location: Left upper arm, BP Patient Position: Sitting, BP Cuff Size: Adult)   Pulse 88   Temp 97.9 °F (36.6 °C) (Temporal)   Resp 18   Ht 5' 4\" (1.626 m)   Wt 185 lb 3.2 oz (84 kg)   SpO2 96%   BMI 31.79 kg/m²      Results for orders placed or performed in visit on 03/11/23   POCT COVID-19, SARS-COV-2, PCR   Result Value Ref Range    SARS-COV-2 PCR, POC Negative Negative VALID INTERNAL CONTROL POC Yes     LOT NUMBER     ASSESSMENT and PLAN  1. Acute otitis media, right  -     amoxicillin-clavulanate (AUGMENTIN) 875-125 mg per tablet; Take 1 Tablet by mouth two (2) times a day for 10 days. , Normal, Disp-20 Tablet, R-0  2.  Acute cough  -     POCT COVID-19, SARS-COV-2, PCR  Throat culture pending, will cover for ear AOM  - Take medication as prescribed, Add OTC tylenol or ibuprofen for fever/discomfort prn  - Rest, push fluids  - Follow up prn    Bela Shook, FNP

## 2023-03-11 NOTE — PROGRESS NOTES
Chief Complaint   Patient presents with    Ear Pain     Apox x 7 days         1. \"Have you been to the ER, urgent care clinic since your last visit? Hospitalized since your last visit? \" No    2. \"Have you seen or consulted any other health care providers outside of the [address] [place] [address] since your last visit? \" No              3 most recent PHQ Screens 3/11/2023   Little interest or pleasure in doing things Not at all   Feeling down, depressed, irritable, or hopeless Not at all   Total Score PHQ 2 0       Health Maintenance Due   Topic Date Due    Hepatitis C Screening  Never done    COVID-19 Vaccine (1) Never done    DTaP/Tdap/Td series (1 - Tdap) Never done    Cervical cancer screen  Never done    Lipid Screen  Never done    Colorectal Cancer Screening Combo  Never done    Shingles Vaccine (1 of 2) Never done    Breast Cancer Screen Mammogram  Never done    Flu Vaccine (1) 08/01/2022

## 2023-04-22 DIAGNOSIS — M54.50 ACUTE MIDLINE LOW BACK PAIN WITHOUT SCIATICA: Primary | ICD-10-CM

## 2023-04-22 DIAGNOSIS — M79.644 PAIN OF RIGHT MIDDLE FINGER: Primary | ICD-10-CM

## 2023-07-17 ENCOUNTER — OFFICE VISIT (OUTPATIENT)
Age: 53
End: 2023-07-17
Payer: COMMERCIAL

## 2023-07-17 VITALS
RESPIRATION RATE: 16 BRPM | TEMPERATURE: 98 F | BODY MASS INDEX: 30.9 KG/M2 | SYSTOLIC BLOOD PRESSURE: 127 MMHG | HEIGHT: 64 IN | DIASTOLIC BLOOD PRESSURE: 85 MMHG | WEIGHT: 181 LBS | HEART RATE: 76 BPM | OXYGEN SATURATION: 95 %

## 2023-07-17 DIAGNOSIS — J34.89 SINUS PRESSURE: ICD-10-CM

## 2023-07-17 DIAGNOSIS — B34.9 VIRAL ILLNESS: ICD-10-CM

## 2023-07-17 DIAGNOSIS — J06.9 URI WITH COUGH AND CONGESTION: ICD-10-CM

## 2023-07-17 DIAGNOSIS — U07.1 COVID: Primary | ICD-10-CM

## 2023-07-17 LAB
EXP DATE SOLUTION: NORMAL
EXP DATE SWAB: NORMAL
EXPIRATION DATE: NORMAL
GROUP A STREP ANTIGEN, POC: NEGATIVE
LOT NUMBER POC: NORMAL
LOT NUMBER SOLUTION: NORMAL
LOT NUMBER SWAB: NORMAL
SARS-COV-2 RNA, POC: POSITIVE
VALID INTERNAL CONTROL, POC: YES

## 2023-07-17 PROCEDURE — 99213 OFFICE O/P EST LOW 20 MIN: CPT

## 2023-07-17 PROCEDURE — 3074F SYST BP LT 130 MM HG: CPT

## 2023-07-17 PROCEDURE — 87880 STREP A ASSAY W/OPTIC: CPT

## 2023-07-17 PROCEDURE — 87635 SARS-COV-2 COVID-19 AMP PRB: CPT

## 2023-07-17 ASSESSMENT — ENCOUNTER SYMPTOMS
COUGH: 1
SINUS PRESSURE: 1

## 2023-07-17 NOTE — PROGRESS NOTES
Chief Complaint   Patient presents with    Sinus Problem     Sinus pressure and congestion. Cough and some stuffiness. Started last week. HISTORY OF PRESENT ILLNESS  Desirae Avelar is a 48 y.o. female. Patient reports she returned from vacation (in [place]) x 1 week ago and began with sore throat which has improved, cough with mucus, post nasal drip, and head pressure. Reports using motrin, tylenol and tylenol cold with no improvement of symptoms. Denies fevers. Denies sick contacts or home covid testing. Review of Systems   HENT:  Positive for sinus pressure. Respiratory:  Positive for cough. Physical Exam  Constitutional:       Appearance: Normal appearance. She is well-developed and well-groomed. She is not ill-appearing. HENT:      Head: Normocephalic. Right Ear: Tympanic membrane normal.      Left Ear: Tympanic membrane normal.      Nose: No congestion or rhinorrhea. Right Sinus: Maxillary sinus tenderness and frontal sinus tenderness present. Left Sinus: Maxillary sinus tenderness and frontal sinus tenderness present. Mouth/Throat:      Mouth: Mucous membranes are moist.      Pharynx: Oropharynx is clear. Posterior oropharyngeal erythema present. No oropharyngeal exudate. Cardiovascular:      Rate and Rhythm: Normal rate. Pulses: Normal pulses. Heart sounds: Normal heart sounds. Pulmonary:      Effort: Pulmonary effort is normal.      Breath sounds: Normal breath sounds. No stridor. No wheezing, rhonchi or rales. Lymphadenopathy:      Cervical: Cervical adenopathy present. Right cervical: Superficial cervical adenopathy present. No deep or posterior cervical adenopathy. Left cervical: Superficial cervical adenopathy present. No deep or posterior cervical adenopathy.        Past Medical History:   Diagnosis Date    Hypertension      Past Surgical History:   Procedure Laterality Date    HEENT      lasix    HERNIA REPAIR N/A        Vitals:    07/17/23

## 2023-07-17 NOTE — PATIENT INSTRUCTIONS
- rest/ push fluids  - take OTC medication tylenol for pain/discomfort as needed  - Consider using OTC coriceden BPH

## 2023-08-23 ENCOUNTER — OFFICE VISIT (OUTPATIENT)
Age: 53
End: 2023-08-23

## 2023-08-23 VITALS
SYSTOLIC BLOOD PRESSURE: 137 MMHG | HEART RATE: 73 BPM | WEIGHT: 178 LBS | BODY MASS INDEX: 30.55 KG/M2 | OXYGEN SATURATION: 98 % | DIASTOLIC BLOOD PRESSURE: 92 MMHG | TEMPERATURE: 97.5 F

## 2023-08-23 DIAGNOSIS — M54.31 SCIATICA OF RIGHT SIDE: Primary | ICD-10-CM

## 2023-08-23 RX ORDER — METHYLPREDNISOLONE 4 MG/1
TABLET ORAL
Qty: 1 KIT | Refills: 0 | Status: SHIPPED | OUTPATIENT
Start: 2023-08-23 | End: 2023-08-29

## 2023-08-23 RX ORDER — CYCLOBENZAPRINE HCL 10 MG
10 TABLET ORAL 3 TIMES DAILY PRN
Qty: 30 TABLET | Refills: 0 | Status: SHIPPED | OUTPATIENT
Start: 2023-08-23 | End: 2023-09-02

## 2023-08-23 ASSESSMENT — ENCOUNTER SYMPTOMS
RESPIRATORY NEGATIVE: 1
BACK PAIN: 1

## 2023-08-23 NOTE — PROGRESS NOTES
Jerson Santos is a 48 y.o. female who presents today for the following: patient was seen with reports of lower back pain that moves into the hip and down the leg. This began 4 days ago. Moved her daughter into her dorm in the last 2 days. No falls or injury. No numbness or tingling. Rates the pain at a 7. 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 Chief Complaint   Patient presents with    Back Pain     Pt. C/o lower back pain and its feel like its going into legs            PMH/PSH/Allergies/Social History/medication list and most recent studies reviewed with patient. reports that she has never smoked. She has never used smokeless tobacco.    reports no history of alcohol use.      Vitals:    08/23/23 1854   BP: (!) 137/92   Pulse:    Temp:    SpO2:       Past Medical History:   Diagnosis Date    Hypertension        Allergies   Allergen Reactions    Prochlorperazine Anaphylaxis     Other reaction(s): Unknown (comments)    Sulfa Antibiotics Anaphylaxis and Rash        Current Outpatient Medications on File Prior to Visit   Medication Sig Dispense Refill    amLODIPine (NORVASC) 5 MG tablet ceived the following from [facility]: Outside name: amLODIPine (NORVASC) 5 mg tablet      Cetirizine HCl (ZYRTEC ALLERGY) 10 MG CAPS Zyrtec 10 mg capsule   Take by oral route.      ergocalciferol (ERGOCALCIFEROL) 1.25 MG (94234 UT) capsule TAKE 1 CAPSULE ORALLY ONCE A WEEK FOR VITAMIN D FOR 90 DAYS      fluticasone (FLONASE) 50 MCG/ACT nasal spray 2 sprays by Nasal route daily      ipratropium (ATROVENT) 0.06 % nasal spray SPRAY 1 TO 2 SPRAYS INTO BOTH NOSTRILS THREE TIMES DAILY AS NEEDED      lansoprazole (PREVACID) 30 MG delayed release capsule ceived the following from [facility]: Outside name: lansoprazole (PREVACID) 30 mg capsule      norethindrone (MICRONOR) 0.35 MG tablet ceived the following from [facility]: Outside name: norethindrone (MICRONOR) 0.35 mg

## 2023-09-23 ENCOUNTER — OFFICE VISIT (OUTPATIENT)
Age: 53
End: 2023-09-23

## 2023-09-23 VITALS
OXYGEN SATURATION: 97 % | TEMPERATURE: 97 F | DIASTOLIC BLOOD PRESSURE: 83 MMHG | RESPIRATION RATE: 18 BRPM | WEIGHT: 181.6 LBS | BODY MASS INDEX: 31 KG/M2 | HEART RATE: 76 BPM | SYSTOLIC BLOOD PRESSURE: 122 MMHG | HEIGHT: 64 IN

## 2023-09-23 DIAGNOSIS — H69.82 DYSFUNCTION OF LEFT EUSTACHIAN TUBE: Primary | ICD-10-CM

## 2023-09-23 DIAGNOSIS — R09.81 SINUS CONGESTION: ICD-10-CM

## 2023-09-23 RX ORDER — CYCLOBENZAPRINE HCL 5 MG
TABLET ORAL
COMMUNITY
Start: 2023-09-19

## 2023-09-23 RX ORDER — MONTELUKAST SODIUM 10 MG/1
10 TABLET ORAL
COMMUNITY
Start: 2023-09-06

## 2023-09-23 ASSESSMENT — ENCOUNTER SYMPTOMS: SINUS PRESSURE: 1

## 2023-09-23 NOTE — PATIENT INSTRUCTIONS
- Consider using OTC Nasacort daily non drowsy allergy medication such as zyrtec, claritin or allegra and benadryl at night    - Take OTC tylenol every 6-8 hours for pain and discomfort for the next 3 days.     - Coricidin BPH for congestion    - Follow up if symptoms worsens/does not improve

## 2023-10-15 ENCOUNTER — OFFICE VISIT (OUTPATIENT)
Age: 53
End: 2023-10-15

## 2023-10-15 VITALS
RESPIRATION RATE: 16 BRPM | HEART RATE: 71 BPM | OXYGEN SATURATION: 98 % | TEMPERATURE: 97.9 F | SYSTOLIC BLOOD PRESSURE: 133 MMHG | BODY MASS INDEX: 30.73 KG/M2 | DIASTOLIC BLOOD PRESSURE: 84 MMHG | WEIGHT: 180 LBS | HEIGHT: 64 IN

## 2023-10-15 DIAGNOSIS — L50.9 URTICARIA: Primary | ICD-10-CM

## 2023-10-15 RX ORDER — FAMOTIDINE 20 MG/1
TABLET, FILM COATED ORAL
COMMUNITY
Start: 2023-10-14

## 2023-10-15 RX ORDER — PREDNISONE 10 MG/1
TABLET ORAL
COMMUNITY
Start: 2023-10-14

## 2023-10-15 RX ORDER — LORATADINE 10 MG/1
10 TABLET ORAL DAILY
COMMUNITY

## 2023-10-15 RX ORDER — HYDROXYZINE HYDROCHLORIDE 25 MG/1
25 TABLET, FILM COATED ORAL EVERY 8 HOURS PRN
Qty: 30 TABLET | Refills: 0 | Status: SHIPPED | OUTPATIENT
Start: 2023-10-15 | End: 2023-10-25

## 2023-10-15 ASSESSMENT — ENCOUNTER SYMPTOMS
URTICARIA: 1
SHORTNESS OF BREATH: 0
SORE THROAT: 0

## 2023-10-19 ENCOUNTER — OFFICE VISIT (OUTPATIENT)
Age: 53
End: 2023-10-19

## 2023-10-19 VITALS
BODY MASS INDEX: 31.46 KG/M2 | OXYGEN SATURATION: 98 % | WEIGHT: 183.3 LBS | DIASTOLIC BLOOD PRESSURE: 104 MMHG | SYSTOLIC BLOOD PRESSURE: 156 MMHG | HEART RATE: 86 BPM | TEMPERATURE: 98.7 F

## 2023-10-19 DIAGNOSIS — I10 ESSENTIAL HYPERTENSION: ICD-10-CM

## 2023-10-19 DIAGNOSIS — H66.91 RIGHT ACUTE OTITIS MEDIA: Primary | ICD-10-CM

## 2023-10-19 RX ORDER — AMOXICILLIN AND CLAVULANATE POTASSIUM 875; 125 MG/1; MG/1
1 TABLET, FILM COATED ORAL 2 TIMES DAILY
Qty: 20 TABLET | Refills: 0 | Status: SHIPPED | OUTPATIENT
Start: 2023-10-19 | End: 2023-10-29

## 2023-10-19 NOTE — PROGRESS NOTES
Renate Dillard (:  1970) is a 48 y.o. female,Established patient, here for evaluation of the following chief complaint(s):  right ear pain      ASSESSMENT/PLAN:  Visit Diagnoses and Associated Orders       Right acute otitis media    -  Primary    amoxicillin-clavulanate (AUGMENTIN) 875-125 MG per tablet [[id number]]           Essential hypertension                        Per orders   Continue current BP meds. Stressful day at work today. recheck at home, follow up with PCP if remains > 140/90   Follow up with PCP in PRN days if symptoms persist or if symptoms worsen. SUBJECTIVE/OBJECTIVE:  HPI  HPI:   48 y.o. female presents with symptoms of Ear Pain  Patient complains of ear pain and possible ear infection. Symptoms include right ear pain. Onset of symptoms was a few days ago, gradually worsening since that time. Denies any fevers, chills, sinus congestion, sinus pain, cough. Recently pt was on cefdinir for R AOM but started having hives so antibiotic stopped. Took for 7/10 days. Allergist doesn't believe antibiotic caused hives. She has having labs tomorrow for allergy testing. Physical Exam  Constitutional:       General: She is not in acute distress. Appearance: Normal appearance. She is not ill-appearing or toxic-appearing. HENT:      Head: Normocephalic and atraumatic. Right Ear: Ear canal and external ear normal. Tympanic membrane is erythematous and bulging. Left Ear: Tympanic membrane, ear canal and external ear normal.      Nose: Nose normal.      Mouth/Throat:      Mouth: Mucous membranes are moist.      Pharynx: Oropharynx is clear. Eyes:      Extraocular Movements: Extraocular movements intact. Conjunctiva/sclera: Conjunctivae normal.      Pupils: Pupils are equal, round, and reactive to light. Cardiovascular:      Rate and Rhythm: Normal rate. Pulmonary:      Effort: Pulmonary effort is normal.      Breath sounds: Normal breath sounds.    Musculoskeletal:

## 2023-11-05 ENCOUNTER — OFFICE VISIT (OUTPATIENT)
Age: 53
End: 2023-11-05

## 2023-11-05 VITALS
HEART RATE: 84 BPM | DIASTOLIC BLOOD PRESSURE: 88 MMHG | SYSTOLIC BLOOD PRESSURE: 164 MMHG | RESPIRATION RATE: 16 BRPM | WEIGHT: 182 LBS | HEIGHT: 64 IN | OXYGEN SATURATION: 95 % | BODY MASS INDEX: 31.07 KG/M2

## 2023-11-05 DIAGNOSIS — J30.2 SEASONAL ALLERGIC RHINITIS, UNSPECIFIED TRIGGER: ICD-10-CM

## 2023-11-05 DIAGNOSIS — J06.9 VIRAL UPPER RESPIRATORY TRACT INFECTION: ICD-10-CM

## 2023-11-05 DIAGNOSIS — R05.1 ACUTE COUGH: Primary | ICD-10-CM

## 2023-11-05 LAB
INFLUENZA A ANTIGEN, POC: NEGATIVE
INFLUENZA B ANTIGEN, POC: NEGATIVE
VALID INTERNAL CONTROL, POC: NORMAL

## 2023-12-10 ENCOUNTER — OFFICE VISIT (OUTPATIENT)
Age: 53
End: 2023-12-10

## 2023-12-10 VITALS
HEIGHT: 64 IN | TEMPERATURE: 98.6 F | BODY MASS INDEX: 30.97 KG/M2 | DIASTOLIC BLOOD PRESSURE: 77 MMHG | WEIGHT: 181.4 LBS | HEART RATE: 80 BPM | SYSTOLIC BLOOD PRESSURE: 131 MMHG | OXYGEN SATURATION: 100 %

## 2023-12-10 DIAGNOSIS — J40 BRONCHITIS: Primary | ICD-10-CM

## 2023-12-10 RX ORDER — AZITHROMYCIN 250 MG/1
250 TABLET, FILM COATED ORAL SEE ADMIN INSTRUCTIONS
Qty: 6 TABLET | Refills: 0 | Status: SHIPPED | OUTPATIENT
Start: 2023-12-10 | End: 2023-12-15

## 2023-12-10 ASSESSMENT — ENCOUNTER SYMPTOMS
COUGH: 1
HEARTBURN: 0
CHEST TIGHTNESS: 0
SHORTNESS OF BREATH: 0
WHEEZING: 0
HEMOPTYSIS: 0

## 2024-06-06 ENCOUNTER — OFFICE VISIT (OUTPATIENT)
Age: 54
End: 2024-06-06

## 2024-06-06 VITALS
OXYGEN SATURATION: 99 % | RESPIRATION RATE: 18 BRPM | BODY MASS INDEX: 31.41 KG/M2 | DIASTOLIC BLOOD PRESSURE: 79 MMHG | WEIGHT: 183 LBS | SYSTOLIC BLOOD PRESSURE: 137 MMHG | HEART RATE: 89 BPM

## 2024-06-06 DIAGNOSIS — S46.812A STRAIN OF LEFT TRAPEZIUS MUSCLE, INITIAL ENCOUNTER: Primary | ICD-10-CM

## 2024-06-06 DIAGNOSIS — M79.2 RADICULAR PAIN IN LEFT ARM: ICD-10-CM

## 2024-06-06 DIAGNOSIS — R52 PAIN: ICD-10-CM

## 2024-06-06 RX ORDER — PREDNISONE 10 MG/1
TABLET ORAL
Qty: 1 EACH | Refills: 0 | Status: SHIPPED | OUTPATIENT
Start: 2024-06-06

## 2024-06-06 RX ORDER — METHOCARBAMOL 750 MG/1
750 TABLET, FILM COATED ORAL 4 TIMES DAILY PRN
Qty: 20 TABLET | Refills: 0 | Status: SHIPPED | OUTPATIENT
Start: 2024-06-06 | End: 2024-06-11

## 2024-06-06 NOTE — PROGRESS NOTES
Paris Mccormick (:  1970) is a 53 y.o. female,Established patient, here for evaluation of the following chief complaint(s):  Shoulder Pain (C/o left shoulder pain)      Assessment & Plan :  Visit Diagnoses and Associated Orders       Strain of left trapezius muscle, initial encounter    -  Primary    methocarbamol (ROBAXIN-750) 750 MG tablet [[id number]]           Pain        XR SHOULDER LEFT (MIN 2 VIEWS) [17093 CPT(R)]   - Future Order         Radicular pain in left arm        predniSONE 10 MG (21) TBPK [[id number]]                    You were seen today for evaluation of left shoulder pain  X-rays of the shoulder are unremarkable  Methocarbamol up to 4x daily for pain and tightness, be advised this may make you drowsy, do not take with other muscle relaxer  Because the pain is radiating into the left arm, will treat with a short course of prednisone, please take as directed  OK to take tylenol as needed for pain  Warm heating packs to the affected area  Begin gentle stretching as tolerated  Please follow up with ortho if symptoms persist or worsen    Subjective :    Shoulder Pain          53 y.o. female presents with symptoms of 3 days of left shoulder pain. Denies trauma or injury, states she is very active and does frequent yoga and weight lifting. Denies any especially difficult or challenging recent workouts. Denies falls. Reports pain in the left trapezius which radiates down into posterior aspect of the upper arm. Denies any tingling, weakness or numbness of the hand or fingers. Not taking anything currently for symptoms.         Vitals:    24 1715   BP: 137/79   Site: Left Upper Arm   Position: Sitting   Cuff Size: Large Adult   Pulse: 89   Resp: 18   SpO2: 99%   Weight: 83 kg (183 lb)       Xray Result (most recent):  XR SHOULDER LEFT (MIN 2 VIEWS) 2024    Narrative  Examination  Xray, multiple views of the Shoulder left    Comparison  None provided.    Findings  The soft tissues are

## 2024-06-06 NOTE — PATIENT INSTRUCTIONS
You were seen today for evaluation of left shoulder pain  X-rays of the shoulder are unremarkable  Methocarbamol up to 4x daily for pain and tightness, be advised this may make you drowsy, do not take with other muscle relaxer  Because the pain is radiating into the left arm, will treat with a short course of prednisone, please take as directed  OK to take tylenol as needed for pain  Warm heating packs to the affected area  Begin gentle stretching as tolerated  Please follow up with ortho if symptoms persist or worsen

## 2024-07-30 ENCOUNTER — OFFICE VISIT (OUTPATIENT)
Age: 54
End: 2024-07-30

## 2024-07-30 VITALS
BODY MASS INDEX: 31.76 KG/M2 | HEART RATE: 84 BPM | SYSTOLIC BLOOD PRESSURE: 154 MMHG | DIASTOLIC BLOOD PRESSURE: 89 MMHG | OXYGEN SATURATION: 98 % | TEMPERATURE: 98.4 F | WEIGHT: 185 LBS

## 2024-07-30 DIAGNOSIS — J01.90 ACUTE BACTERIAL SINUSITIS: Primary | ICD-10-CM

## 2024-07-30 DIAGNOSIS — R05.1 ACUTE COUGH: ICD-10-CM

## 2024-07-30 DIAGNOSIS — B96.89 ACUTE BACTERIAL SINUSITIS: Primary | ICD-10-CM

## 2024-07-30 RX ORDER — BENZONATATE 200 MG/1
200 CAPSULE ORAL 3 TIMES DAILY PRN
Qty: 21 CAPSULE | Refills: 0 | Status: SHIPPED | OUTPATIENT
Start: 2024-07-30 | End: 2024-08-06

## 2024-07-30 RX ORDER — AMOXICILLIN AND CLAVULANATE POTASSIUM 875; 125 MG/1; MG/1
1 TABLET, FILM COATED ORAL 2 TIMES DAILY
Qty: 14 TABLET | Refills: 0 | Status: SHIPPED | OUTPATIENT
Start: 2024-07-30 | End: 2024-08-06

## 2024-07-30 ASSESSMENT — ENCOUNTER SYMPTOMS: SINUS COMPLAINT: 1

## 2024-07-30 NOTE — PROGRESS NOTES
(36.9 °C)   SpO2: 98%   Weight: 83.9 kg (185 lb)       No results found for this visit on 07/30/24.      Objective   Physical Exam  Vitals and nursing note reviewed.   Constitutional:       General: She is not in acute distress.     Appearance: Normal appearance. She is ill-appearing.   HENT:      Head: Normocephalic and atraumatic.      Right Ear: Tympanic membrane, ear canal and external ear normal. There is no impacted cerumen.      Left Ear: Tympanic membrane, ear canal and external ear normal. There is no impacted cerumen.      Nose: Congestion and rhinorrhea present.      Right Turbinates: Enlarged and swollen.      Left Turbinates: Enlarged and swollen.      Right Sinus: Maxillary sinus tenderness and frontal sinus tenderness present.      Left Sinus: Maxillary sinus tenderness and frontal sinus tenderness present.      Mouth/Throat:      Mouth: Mucous membranes are moist.      Pharynx: Oropharynx is clear. No oropharyngeal exudate or posterior oropharyngeal erythema.   Cardiovascular:      Rate and Rhythm: Normal rate and regular rhythm.      Pulses: Normal pulses.      Heart sounds: Normal heart sounds. No murmur heard.     No friction rub. No gallop.   Pulmonary:      Effort: Pulmonary effort is normal. No respiratory distress.      Breath sounds: Normal breath sounds. No wheezing, rhonchi or rales.   Musculoskeletal:      Cervical back: Normal range of motion and neck supple. No tenderness.   Lymphadenopathy:      Cervical: No cervical adenopathy.   Skin:     General: Skin is warm and dry.   Neurological:      General: No focal deficit present.      Mental Status: She is alert and oriented to person, place, and time.               An electronic signature was used to authenticate this note.    TYSON Mc NP

## 2024-07-30 NOTE — PATIENT INSTRUCTIONS
Symptoms consistent with acute bacterial sinusitis  Augmentin as ordered, 2x per day for 7 days  Benzonatate up to 3x daily for cough  Plain Mucinex and dextromethorphan OTC to help thin secretions and help with cough  Saline sinus rinses, hot tea with honey, throat lozenges  Lots of fluids, plenty of rest  Return if symptoms persist or worsen  ED with any severe shortness of breath, chest pain, or if unable to tolerate food or fluids

## 2024-09-09 ENCOUNTER — OFFICE VISIT (OUTPATIENT)
Age: 54
End: 2024-09-09

## 2024-09-09 VITALS
SYSTOLIC BLOOD PRESSURE: 117 MMHG | DIASTOLIC BLOOD PRESSURE: 77 MMHG | BODY MASS INDEX: 32.27 KG/M2 | OXYGEN SATURATION: 98 % | HEART RATE: 77 BPM | TEMPERATURE: 98.5 F | WEIGHT: 188 LBS

## 2024-09-09 DIAGNOSIS — B96.89 ACUTE BACTERIAL SINUSITIS: Primary | ICD-10-CM

## 2024-09-09 DIAGNOSIS — J01.90 ACUTE BACTERIAL SINUSITIS: Primary | ICD-10-CM

## 2024-09-09 RX ORDER — ACETAMINOPHEN AND CODEINE PHOSPHATE 120; 12 MG/5ML; MG/5ML
1 SOLUTION ORAL DAILY
COMMUNITY
End: 2024-09-09

## 2024-09-09 RX ORDER — CETIRIZINE HYDROCHLORIDE 10 MG/1
10 TABLET ORAL DAILY
COMMUNITY
Start: 2019-03-25

## 2024-09-09 RX ORDER — ACETAMINOPHEN AND CODEINE PHOSPHATE 120; 12 MG/5ML; MG/5ML
1 SOLUTION ORAL DAILY
COMMUNITY

## 2024-09-09 ASSESSMENT — ENCOUNTER SYMPTOMS: COUGH: 1

## 2025-02-03 ENCOUNTER — OFFICE VISIT (OUTPATIENT)
Age: 55
End: 2025-02-03

## 2025-02-03 VITALS
BODY MASS INDEX: 32.79 KG/M2 | WEIGHT: 191 LBS | RESPIRATION RATE: 16 BRPM | DIASTOLIC BLOOD PRESSURE: 81 MMHG | TEMPERATURE: 98.8 F | OXYGEN SATURATION: 99 % | SYSTOLIC BLOOD PRESSURE: 119 MMHG | HEART RATE: 78 BPM

## 2025-02-03 DIAGNOSIS — H92.03 EAR PAIN, BILATERAL: ICD-10-CM

## 2025-02-03 DIAGNOSIS — J02.9 SORE THROAT: ICD-10-CM

## 2025-02-03 DIAGNOSIS — R09.81 NASAL CONGESTION: ICD-10-CM

## 2025-02-03 DIAGNOSIS — J06.9 VIRAL UPPER RESPIRATORY TRACT INFECTION: Primary | ICD-10-CM

## 2025-02-03 LAB
Lab: NORMAL
PERFORMING INSTRUMENT: NORMAL
QC PASS/FAIL: NORMAL
SARS-COV-2, POC: NORMAL

## 2025-02-03 RX ORDER — VONOPRAZAN FUMARATE 13.36 MG/1
TABLET ORAL
COMMUNITY
Start: 2024-11-13

## 2025-02-03 RX ORDER — CHOLECALCIFEROL (VITAMIN D3) 50 MCG
TABLET ORAL
COMMUNITY

## 2025-02-03 RX ORDER — ACETAMINOPHEN AND CODEINE PHOSPHATE 120; 12 MG/5ML; MG/5ML
1 SOLUTION ORAL DAILY
COMMUNITY
Start: 2024-03-07 | End: 2025-02-03

## 2025-02-03 NOTE — PATIENT INSTRUCTIONS
COVID test negative.  May have the flu however she is outside of the window for treatment with Tamiflu, and is not febrile.  Work note to stay home tomorrow given.  Lots of rest, warm liquids and honey, water.  If develops fever will need to stay home for longer.  She will let us know if this happens

## 2025-02-03 NOTE — PROGRESS NOTES
Paris Mccormick (:  1970) is a 54 y.o. female,Established patient, here for evaluation of the following chief complaint(s):  Pharyngitis (Sore throat, sinus/chest congestion, Bilateral ear pain, chest soreness, x3 days)      Assessment & Plan :  Visit Diagnoses and Associated Orders       Nasal congestion    -  Primary         Sore throat             Ear pain, bilateral             ORDERS WITHOUT AN ASSOCIATED DIAGNOSIS    Cholecalciferol (VITAMIN D3) 50 MCG (2000 UT) TABS [[id number]]      VOQUEZNA 10 MG TABS [[id number]]              COVID test negative.  May have the flu however she is outside of the window for treatment to start Tamiflu, and is not febrile.  Work note to stay home tomorrow given.  Lots of rest, warm liquids and honey, water.  If develops fever will need to stay home for longer.  She will let us know if this happens    Subjective :    HPI:   54 y.o. female presents with nasal congestion, facial pain and fullness, ear pain bilaterally, sore throat, dry occasional cough, chills for 3 days.  Denies fever, chest pain, shortness of breath, nausea, or vomiting.  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    Vitals:    25 1611 25 1618   BP: 126/83 119/81   Site: Left Upper Arm Left Upper Arm   Position: Sitting Sitting   Cuff Size: Medium Adult Medium Adult   Pulse: 78    Resp: 16    Temp: 98.8 °F (37.1 °C)    SpO2: 99%    Weight: 86.6 kg (191 lb)           Objective   Physical Exam  Constitutional:       General: She is not in acute distress.     Appearance: Normal appearance.   HENT:      Head: Normocephalic.      Comments: No maxillary or frontal sinus tenderness     Right Ear: Tympanic membrane is injected. Tympanic membrane is not bulging.      Left Ear: Tympanic membrane is injected. Tympanic membrane is not bulging.      Nose: Nose normal.      Mouth/Throat:      Mouth: Mucous membranes are moist.      Pharynx: Oropharynx is clear. No oropharyngeal

## 2025-06-16 ENCOUNTER — OFFICE VISIT (OUTPATIENT)
Age: 55
End: 2025-06-16

## 2025-06-16 VITALS
SYSTOLIC BLOOD PRESSURE: 151 MMHG | WEIGHT: 184 LBS | DIASTOLIC BLOOD PRESSURE: 90 MMHG | RESPIRATION RATE: 16 BRPM | TEMPERATURE: 98.1 F | HEART RATE: 71 BPM | OXYGEN SATURATION: 98 % | BODY MASS INDEX: 31.58 KG/M2

## 2025-06-16 DIAGNOSIS — J01.10 ACUTE NON-RECURRENT FRONTAL SINUSITIS: Primary | ICD-10-CM

## 2025-06-16 NOTE — PROGRESS NOTES
others to smoke around you. If you need help quitting, talk to your doctor about stop-smoking programs and medicines. These can increase your chances of quitting for good.  Return to Clinic if mild worsening or changes in symptoms.  Report to ER if high or persistent fever, dehydration, new or severe worsening of symptoms.  Please follow up with PCP for persistent or recurrent symptoms.      Subjective :    Ear Pain          54 y.o. female presents with over a week of nasal congestion, facial pain and pressure especially in the right side, and right ear pain.  Denies fever, chills, sore throat, shortness of breath, cough, nausea or vomiting.       Vitals:    06/16/25 1828   BP: (!) 151/90   BP Site: Right Upper Arm   Patient Position: Sitting   BP Cuff Size: Large Adult   Pulse: 71   Resp: 16   Temp: 98.1 °F (36.7 °C)   SpO2: 98%   Weight: 83.5 kg (184 lb)       No results found for this visit on 06/16/25.      Objective   Physical Exam  Vitals and nursing note reviewed.   Constitutional:       General: She is not in acute distress.     Appearance: She is ill-appearing. She is not toxic-appearing.   HENT:      Head: Normocephalic.      Comments: Right frontal sinuses mildly tender to palpation     Right Ear: Ear canal and external ear normal.      Left Ear: Tympanic membrane, ear canal and external ear normal.      Ears:      Comments: Right TM erythematous and bulging     Nose: Nose normal.      Mouth/Throat:      Mouth: Mucous membranes are moist.   Eyes:      Conjunctiva/sclera: Conjunctivae normal.   Cardiovascular:      Rate and Rhythm: Normal rate and regular rhythm.   Pulmonary:      Effort: Pulmonary effort is normal.      Breath sounds: Normal breath sounds.   Skin:     General: Skin is warm and dry.   Neurological:      Mental Status: She is alert and oriented to person, place, and time.   Psychiatric:         Mood and Affect: Mood normal.         Behavior: Behavior normal.               An electronic

## 2025-06-16 NOTE — PATIENT INSTRUCTIONS
Augmentin: 1 pill twice daily x 10 days    If the doctor prescribed antibiotics, take them as directed. Do not stop taking them just because you feel better. You need to take the full course of antibiotics.  Sinus infections are usually self-limiting and do not require antibiotic therapy.   Use saline (saltwater) nasal washes. This can help keep your nasal passages open and wash out mucus and allergens.  You can buy saline nose washes at a grocery store or drugstore. Follow the instructions on the package.  Try a decongestant nasal spray like oxymetazoline (Afrin) Do not use it for more than 3 days in a row. Using it for more than 3 days can make your congestion worse.  If needed, take an over-the-counter pain medicine, such as acetaminophen (Tylenol), ibuprofen (Advil, Motrin), or naproxen (Aleve). Read and follow all instructions on the label.  Be careful when taking over-the-counter cold or influenza (flu) medicines and Tylenol at the same time. Many of these medicines have acetaminophen, which is Tylenol. Read the labels to make sure that you are not taking more than the recommended dose. Too much acetaminophen (Tylenol) can be harmful.  Try a steroid nasal spray daily such as Flonase as well as antihistamine such as Claritin or Zyrtec.   Breathe warm, moist air. You can use a steamy shower, a hot bath, or a sink filled with hot water. Avoid cold, dry air. Using a humidifier in your home may help. Follow the directions for cleaning the machine.  Do not smoke or allow others to smoke around you. If you need help quitting, talk to your doctor about stop-smoking programs and medicines. These can increase your chances of quitting for good.  Return to Clinic if mild worsening or changes in symptoms.  Report to ER if high or persistent fever, dehydration, new or severe worsening of symptoms.  Please follow up with PCP for persistent or recurrent symptoms.